# Patient Record
Sex: MALE | Race: BLACK OR AFRICAN AMERICAN | Employment: OTHER | URBAN - METROPOLITAN AREA
[De-identification: names, ages, dates, MRNs, and addresses within clinical notes are randomized per-mention and may not be internally consistent; named-entity substitution may affect disease eponyms.]

---

## 2017-01-13 ENCOUNTER — OP HISTORICAL/CONVERTED ENCOUNTER (OUTPATIENT)
Dept: OTHER | Age: 82
End: 2017-01-13

## 2017-03-03 ENCOUNTER — OP HISTORICAL/CONVERTED ENCOUNTER (OUTPATIENT)
Dept: OTHER | Age: 82
End: 2017-03-03

## 2017-03-08 ENCOUNTER — OP HISTORICAL/CONVERTED ENCOUNTER (OUTPATIENT)
Dept: OTHER | Age: 82
End: 2017-03-08

## 2017-04-05 ENCOUNTER — OP HISTORICAL/CONVERTED ENCOUNTER (OUTPATIENT)
Dept: OTHER | Age: 82
End: 2017-04-05

## 2017-04-12 ENCOUNTER — OP HISTORICAL/CONVERTED ENCOUNTER (OUTPATIENT)
Dept: OTHER | Age: 82
End: 2017-04-12

## 2017-06-07 ENCOUNTER — OP HISTORICAL/CONVERTED ENCOUNTER (OUTPATIENT)
Dept: OTHER | Age: 82
End: 2017-06-07

## 2017-08-09 ENCOUNTER — OP HISTORICAL/CONVERTED ENCOUNTER (OUTPATIENT)
Dept: OTHER | Age: 82
End: 2017-08-09

## 2017-11-08 ENCOUNTER — OP HISTORICAL/CONVERTED ENCOUNTER (OUTPATIENT)
Dept: OTHER | Age: 82
End: 2017-11-08

## 2017-12-13 ENCOUNTER — OP HISTORICAL/CONVERTED ENCOUNTER (OUTPATIENT)
Dept: OTHER | Age: 82
End: 2017-12-13

## 2018-02-19 ENCOUNTER — ED HISTORICAL/CONVERTED ENCOUNTER (OUTPATIENT)
Dept: OTHER | Age: 83
End: 2018-02-19

## 2018-07-02 ENCOUNTER — OP HISTORICAL/CONVERTED ENCOUNTER (OUTPATIENT)
Dept: OTHER | Age: 83
End: 2018-07-02

## 2018-08-01 ENCOUNTER — ED HISTORICAL/CONVERTED ENCOUNTER (OUTPATIENT)
Dept: OTHER | Age: 83
End: 2018-08-01

## 2018-10-01 ENCOUNTER — OP HISTORICAL/CONVERTED ENCOUNTER (OUTPATIENT)
Dept: OTHER | Age: 83
End: 2018-10-01

## 2018-11-22 ENCOUNTER — ED HISTORICAL/CONVERTED ENCOUNTER (OUTPATIENT)
Dept: OTHER | Age: 83
End: 2018-11-22

## 2019-01-28 ENCOUNTER — OP HISTORICAL/CONVERTED ENCOUNTER (OUTPATIENT)
Dept: OTHER | Age: 84
End: 2019-01-28

## 2019-04-26 ENCOUNTER — OP HISTORICAL/CONVERTED ENCOUNTER (OUTPATIENT)
Dept: OTHER | Age: 84
End: 2019-04-26

## 2019-05-21 ENCOUNTER — OP HISTORICAL/CONVERTED ENCOUNTER (OUTPATIENT)
Dept: OTHER | Age: 84
End: 2019-05-21

## 2019-10-01 ENCOUNTER — OP HISTORICAL/CONVERTED ENCOUNTER (OUTPATIENT)
Dept: OTHER | Age: 84
End: 2019-10-01

## 2020-02-04 LAB
CREATININE, EXTERNAL: 2.25
HBA1C MFR BLD HPLC: 7.5 %
LDL-C, EXTERNAL: 75

## 2020-08-04 VITALS
HEART RATE: 86 BPM | DIASTOLIC BLOOD PRESSURE: 51 MMHG | WEIGHT: 241 LBS | TEMPERATURE: 97.8 F | RESPIRATION RATE: 18 BRPM | SYSTOLIC BLOOD PRESSURE: 119 MMHG | HEIGHT: 69 IN | OXYGEN SATURATION: 98 % | BODY MASS INDEX: 35.7 KG/M2

## 2020-08-04 DIAGNOSIS — N40.0 PROSTATIC HYPERTROPHY: ICD-10-CM

## 2020-08-04 PROBLEM — R35.1 NOCTURIA: Status: ACTIVE | Noted: 2020-08-04

## 2020-08-04 PROBLEM — R39.198 SLOWING OF URINARY STREAM: Status: ACTIVE | Noted: 2020-08-04

## 2020-08-04 PROBLEM — R39.9 LOWER URINARY TRACT SYMPTOMS: Status: ACTIVE | Noted: 2020-08-04

## 2020-08-04 PROBLEM — R33.9 RETENTION OF URINE: Status: ACTIVE | Noted: 2020-08-04

## 2020-08-04 PROBLEM — R35.0 INCREASED FREQUENCY OF URINATION: Status: ACTIVE | Noted: 2020-08-04

## 2020-08-04 PROBLEM — N13.9 URINARY (TRACT) OBSTRUCTION: Status: ACTIVE | Noted: 2020-08-04

## 2020-08-04 RX ORDER — METFORMIN HYDROCHLORIDE 500 MG/5ML
SOLUTION ORAL
COMMUNITY
End: 2021-08-17

## 2020-08-04 RX ORDER — AMLODIPINE, VALSARTAN AND HYDROCHLOROTHIAZIDE 10; 320; 25 MG/1; MG/1; MG/1
TABLET ORAL
COMMUNITY
End: 2021-03-26 | Stop reason: SDUPTHER

## 2020-08-04 RX ORDER — INSULIN GLARGINE 100 [IU]/ML
INJECTION, SOLUTION SUBCUTANEOUS
COMMUNITY
End: 2022-01-25 | Stop reason: ALTCHOICE

## 2020-08-04 RX ORDER — SYRINGE-NEEDLE,INSULIN,0.5 ML 30 GX5/16"
SYRINGE, EMPTY DISPOSABLE MISCELLANEOUS
COMMUNITY
End: 2022-05-08

## 2020-08-04 RX ORDER — AZITHROMYCIN 250 MG/1
250 TABLET, FILM COATED ORAL DAILY
COMMUNITY
End: 2021-03-26 | Stop reason: ALTCHOICE

## 2020-08-04 RX ORDER — EZETIMIBE 10 MG/1
TABLET ORAL
COMMUNITY
End: 2020-12-23

## 2020-08-04 RX ORDER — COLCHICINE 0.6 MG/1
0.6 TABLET ORAL DAILY
COMMUNITY
End: 2021-07-27

## 2020-08-04 RX ORDER — ALBUTEROL SULFATE 90 UG/1
AEROSOL, METERED RESPIRATORY (INHALATION)
COMMUNITY
End: 2021-12-06 | Stop reason: ALTCHOICE

## 2020-08-04 RX ORDER — TIMOLOL MALEATE 5 MG/ML
1 SOLUTION/ DROPS OPHTHALMIC 2 TIMES DAILY
COMMUNITY

## 2020-08-04 RX ORDER — ASPIRIN 81 MG/1
TABLET ORAL DAILY
COMMUNITY

## 2020-08-04 RX ORDER — ACETAMINOPHEN AND CODEINE PHOSPHATE 300; 30 MG/1; MG/1
1 TABLET ORAL
COMMUNITY
End: 2021-07-27

## 2020-08-04 RX ORDER — NAPROXEN 500 MG/1
500 TABLET ORAL 2 TIMES DAILY WITH MEALS
COMMUNITY
End: 2020-10-14

## 2020-08-04 RX ORDER — FUROSEMIDE 40 MG/1
TABLET ORAL DAILY
COMMUNITY
End: 2021-08-26

## 2020-08-04 RX ORDER — TAMSULOSIN HYDROCHLORIDE 0.4 MG/1
0.4 CAPSULE ORAL DAILY
COMMUNITY
End: 2021-07-08

## 2020-08-04 RX ORDER — LANOLIN ALCOHOL/MO/W.PET/CERES
CREAM (GRAM) TOPICAL
COMMUNITY
End: 2022-05-08

## 2020-08-04 RX ORDER — DICLOFENAC SODIUM 10 MG/G
GEL TOPICAL 4 TIMES DAILY
COMMUNITY
End: 2021-07-27

## 2020-08-04 RX ORDER — PEN NEEDLE, DIABETIC 31 GX3/16"
NEEDLE, DISPOSABLE MISCELLANEOUS
COMMUNITY
End: 2022-05-08

## 2020-08-04 RX ORDER — ISOPROPYL ALCOHOL 70 ML/100ML
SWAB TOPICAL
COMMUNITY
End: 2022-05-08

## 2020-08-04 RX ORDER — PEN NEEDLE, DIABETIC 30 GX3/16"
NEEDLE, DISPOSABLE MISCELLANEOUS
COMMUNITY
End: 2021-03-26 | Stop reason: SDUPTHER

## 2020-08-04 RX ORDER — AMOXICILLIN 500 MG/1
500 CAPSULE ORAL
COMMUNITY
End: 2021-03-26 | Stop reason: ALTCHOICE

## 2020-08-04 RX ORDER — INSULIN LISPRO 100 [IU]/ML
INJECTION, SOLUTION INTRAVENOUS; SUBCUTANEOUS
COMMUNITY
End: 2022-04-07 | Stop reason: SDUPTHER

## 2020-08-04 RX ORDER — LANCETS
EACH MISCELLANEOUS
COMMUNITY
End: 2022-05-08

## 2020-08-04 RX ORDER — ALLOPURINOL 100 MG/1
TABLET ORAL DAILY
COMMUNITY
End: 2021-07-27

## 2020-08-04 RX ORDER — BLOOD SUGAR DIAGNOSTIC
STRIP MISCELLANEOUS SEE ADMIN INSTRUCTIONS
COMMUNITY
End: 2022-05-08

## 2020-08-04 RX ORDER — AZELASTINE HCL 205.5 UG/1
SPRAY NASAL 2 TIMES DAILY
COMMUNITY
End: 2021-07-27 | Stop reason: SDUPTHER

## 2020-08-04 RX ORDER — POLYETHYLENE GLYCOL 3350, SODIUM SULFATE ANHYDROUS, SODIUM BICARBONATE, SODIUM CHLORIDE, POTASSIUM CHLORIDE 236; 22.74; 6.74; 5.86; 2.97 G/4L; G/4L; G/4L; G/4L; G/4L
POWDER, FOR SOLUTION ORAL
COMMUNITY
End: 2021-08-17

## 2020-08-04 RX ORDER — BENZONATATE 200 MG/1
200 CAPSULE ORAL
COMMUNITY
End: 2021-07-27

## 2020-08-04 RX ORDER — DONEPEZIL HYDROCHLORIDE 5 MG/1
TABLET, FILM COATED ORAL
COMMUNITY
End: 2021-07-27

## 2020-08-04 RX ORDER — FINASTERIDE 5 MG/1
5 TABLET, FILM COATED ORAL DAILY
COMMUNITY
End: 2021-06-11 | Stop reason: SDUPTHER

## 2020-08-04 RX ORDER — PRAVASTATIN SODIUM 40 MG/1
40 TABLET ORAL
COMMUNITY
End: 2020-09-22

## 2020-08-04 RX ORDER — CALCIUM CARB/VITAMIN D3/VIT K1 500-100-40
TABLET,CHEWABLE ORAL
COMMUNITY
End: 2021-03-26 | Stop reason: SDUPTHER

## 2020-08-05 PROBLEM — E78.5 HYPERLIPIDEMIA: Status: ACTIVE | Noted: 2020-08-05

## 2020-08-05 PROBLEM — R41.3 MEMORY IMPAIRMENT: Status: ACTIVE | Noted: 2020-08-05

## 2020-08-05 PROBLEM — I10 ESSENTIAL HYPERTENSION: Status: ACTIVE | Noted: 2020-08-05

## 2020-08-05 PROBLEM — R60.0 EDEMA OF LOWER EXTREMITY: Status: ACTIVE | Noted: 2020-08-05

## 2020-08-05 PROBLEM — D50.9 IRON DEFICIENCY ANEMIA: Status: ACTIVE | Noted: 2020-08-05

## 2020-08-05 PROBLEM — E78.5 DYSLIPIDEMIA ASSOCIATED WITH TYPE 2 DIABETES MELLITUS (HCC): Status: ACTIVE | Noted: 2020-08-05

## 2020-08-05 PROBLEM — I25.10 CORONARY ARTERIOSCLEROSIS: Status: ACTIVE | Noted: 2020-08-05

## 2020-08-05 PROBLEM — N18.30 CHRONIC KIDNEY DISEASE, STAGE 3 (HCC): Status: ACTIVE | Noted: 2020-08-05

## 2020-08-05 PROBLEM — E87.6 HYPOKALEMIA: Status: ACTIVE | Noted: 2020-08-05

## 2020-08-05 PROBLEM — E11.69 DYSLIPIDEMIA ASSOCIATED WITH TYPE 2 DIABETES MELLITUS (HCC): Status: ACTIVE | Noted: 2020-08-05

## 2020-08-05 PROBLEM — E11.9 TYPE 2 DIABETES MELLITUS WITHOUT COMPLICATION (HCC): Status: ACTIVE | Noted: 2020-08-05

## 2020-08-05 RX ORDER — GLUCOSAMINE/CHONDR SU A SOD 167-133 MG
500 CAPSULE ORAL
COMMUNITY
End: 2021-07-27

## 2020-08-05 RX ORDER — PROMETHAZINE HYDROCHLORIDE AND CODEINE PHOSPHATE 6.25; 1 MG/5ML; MG/5ML
SOLUTION ORAL
COMMUNITY
End: 2021-03-26 | Stop reason: SDUPTHER

## 2020-08-05 RX ORDER — PERMETHRIN 50 MG/G
CREAM TOPICAL
COMMUNITY
End: 2021-07-27

## 2020-08-05 RX ORDER — LIDOCAINE 50 MG/G
OINTMENT TOPICAL AS NEEDED
COMMUNITY
End: 2021-07-27

## 2020-08-05 RX ORDER — POLYETHYLENE GLYCOL 3350 17 G/17G
17 POWDER, FOR SOLUTION ORAL DAILY
COMMUNITY
End: 2022-01-25 | Stop reason: ALTCHOICE

## 2020-08-05 RX ORDER — LIDOCAINE AND PRILOCAINE 25; 25 MG/G; MG/G
CREAM TOPICAL AS NEEDED
COMMUNITY
End: 2021-07-27

## 2020-08-05 RX ORDER — PREDNISONE 5 MG/1
TABLET ORAL SEE ADMIN INSTRUCTIONS
COMMUNITY
End: 2021-07-27

## 2020-08-05 RX ORDER — CODEINE PHOSPHATE AND GUAIFENESIN 10; 100 MG/5ML; MG/5ML
5 SOLUTION ORAL
COMMUNITY
End: 2021-07-27

## 2020-08-05 RX ORDER — PROMETHAZINE HYDROCHLORIDE AND PHENYLEPHRINE HYDROCHLORIDE 6.25; 5 MG/5ML; MG/5ML
5 SYRUP ORAL
COMMUNITY
End: 2021-07-27

## 2020-08-05 RX ORDER — SYRINGE-NEEDLE,INSULIN,0.5 ML 30 GX5/16"
SYRINGE, EMPTY DISPOSABLE MISCELLANEOUS
COMMUNITY
End: 2021-03-26 | Stop reason: SDUPTHER

## 2020-08-05 RX ORDER — PREDNISONE 20 MG/1
TABLET ORAL
COMMUNITY
End: 2021-07-27

## 2020-08-05 RX ORDER — TRAMADOL HYDROCHLORIDE 50 MG/1
50 TABLET ORAL
COMMUNITY
End: 2021-07-27

## 2020-09-22 RX ORDER — PRAVASTATIN SODIUM 40 MG/1
TABLET ORAL
Qty: 90 TAB | Refills: 0 | Status: SHIPPED | OUTPATIENT
Start: 2020-09-22 | End: 2021-12-06

## 2020-10-12 ENCOUNTER — TELEPHONE (OUTPATIENT)
Dept: FAMILY MEDICINE CLINIC | Age: 85
End: 2020-10-12

## 2020-10-12 DIAGNOSIS — E78.5 HYPERLIPIDEMIA, UNSPECIFIED HYPERLIPIDEMIA TYPE: Primary | ICD-10-CM

## 2020-10-14 RX ORDER — NAPROXEN 500 MG/1
TABLET ORAL
Qty: 60 TAB | Refills: 0 | Status: SHIPPED | OUTPATIENT
Start: 2020-10-14 | End: 2021-04-27 | Stop reason: SDUPTHER

## 2020-10-14 RX ORDER — PRAVASTATIN SODIUM 40 MG/1
40 TABLET ORAL
Qty: 90 TAB | Refills: 1 | Status: SHIPPED | OUTPATIENT
Start: 2020-10-14 | End: 2020-10-21 | Stop reason: SDUPTHER

## 2020-10-21 RX ORDER — PRAVASTATIN SODIUM 40 MG/1
40 TABLET ORAL
Qty: 90 TAB | Refills: 1 | Status: SHIPPED | OUTPATIENT
Start: 2020-10-21 | End: 2021-04-19

## 2020-12-23 RX ORDER — EZETIMIBE 10 MG/1
TABLET ORAL
Qty: 90 TAB | Refills: 0 | Status: SHIPPED | OUTPATIENT
Start: 2020-12-23 | End: 2021-03-19

## 2021-01-04 ENCOUNTER — TELEPHONE (OUTPATIENT)
Dept: FAMILY MEDICINE CLINIC | Age: 86
End: 2021-01-04

## 2021-01-04 NOTE — TELEPHONE ENCOUNTER
Left a voicemail on both home and mobile phone with the phone number for [facility]. Patient needs to contact them with more information for his medication to be sent.   [phone]

## 2021-01-19 ENCOUNTER — TELEPHONE (OUTPATIENT)
Dept: FAMILY MEDICINE CLINIC | Age: 86
End: 2021-01-19

## 2021-02-12 ENCOUNTER — TELEPHONE (OUTPATIENT)
Dept: FAMILY MEDICINE CLINIC | Age: 86
End: 2021-02-12

## 2021-02-12 RX ORDER — AMLODIPINE AND VALSARTAN 10; 320 MG/1; MG/1
TABLET ORAL
Qty: 90 TAB | Refills: 0 | Status: SHIPPED | OUTPATIENT
Start: 2021-02-12 | End: 2021-02-19

## 2021-02-16 ENCOUNTER — TELEPHONE (OUTPATIENT)
Dept: FAMILY MEDICINE CLINIC | Age: 86
End: 2021-02-16

## 2021-03-05 ENCOUNTER — IMMUNIZATION (OUTPATIENT)
Dept: FAMILY MEDICINE CLINIC | Age: 86
End: 2021-03-05
Payer: MEDICARE

## 2021-03-05 DIAGNOSIS — Z23 ENCOUNTER FOR IMMUNIZATION: Primary | ICD-10-CM

## 2021-03-05 PROCEDURE — 0011A COVID-19, MRNA, LNP-S, PF, 100MCG/0.5ML DOSE(MODERNA): CPT | Performed by: FAMILY MEDICINE

## 2021-03-05 PROCEDURE — 91301 COVID-19, MRNA, LNP-S, PF, 100MCG/0.5ML DOSE(MODERNA): CPT | Performed by: FAMILY MEDICINE

## 2021-03-19 RX ORDER — EZETIMIBE 10 MG/1
TABLET ORAL
Qty: 90 TAB | Refills: 0 | Status: SHIPPED | OUTPATIENT
Start: 2021-03-19 | End: 2021-04-23

## 2021-03-26 ENCOUNTER — OFFICE VISIT (OUTPATIENT)
Dept: FAMILY MEDICINE CLINIC | Age: 86
End: 2021-03-26
Payer: MEDICARE

## 2021-03-26 VITALS
HEART RATE: 85 BPM | WEIGHT: 240 LBS | BODY MASS INDEX: 35.55 KG/M2 | DIASTOLIC BLOOD PRESSURE: 63 MMHG | HEIGHT: 69 IN | RESPIRATION RATE: 16 BRPM | OXYGEN SATURATION: 98 % | TEMPERATURE: 97.1 F | SYSTOLIC BLOOD PRESSURE: 121 MMHG

## 2021-03-26 DIAGNOSIS — E11.9 TYPE 2 DIABETES MELLITUS WITHOUT COMPLICATION, WITH LONG-TERM CURRENT USE OF INSULIN (HCC): ICD-10-CM

## 2021-03-26 DIAGNOSIS — Z79.4 TYPE 2 DIABETES MELLITUS WITHOUT COMPLICATION, WITH LONG-TERM CURRENT USE OF INSULIN (HCC): ICD-10-CM

## 2021-03-26 DIAGNOSIS — I10 ESSENTIAL HYPERTENSION: Primary | ICD-10-CM

## 2021-03-26 DIAGNOSIS — E78.5 HYPERLIPIDEMIA, UNSPECIFIED HYPERLIPIDEMIA TYPE: ICD-10-CM

## 2021-03-26 DIAGNOSIS — E78.5 DYSLIPIDEMIA ASSOCIATED WITH TYPE 2 DIABETES MELLITUS (HCC): ICD-10-CM

## 2021-03-26 DIAGNOSIS — E11.69 DYSLIPIDEMIA ASSOCIATED WITH TYPE 2 DIABETES MELLITUS (HCC): ICD-10-CM

## 2021-03-26 PROCEDURE — G8417 CALC BMI ABV UP PARAM F/U: HCPCS | Performed by: NURSE PRACTITIONER

## 2021-03-26 PROCEDURE — G8432 DEP SCR NOT DOC, RNG: HCPCS | Performed by: NURSE PRACTITIONER

## 2021-03-26 PROCEDURE — G8536 NO DOC ELDER MAL SCRN: HCPCS | Performed by: NURSE PRACTITIONER

## 2021-03-26 PROCEDURE — G8752 SYS BP LESS 140: HCPCS | Performed by: NURSE PRACTITIONER

## 2021-03-26 PROCEDURE — G8427 DOCREV CUR MEDS BY ELIG CLIN: HCPCS | Performed by: NURSE PRACTITIONER

## 2021-03-26 PROCEDURE — G8754 DIAS BP LESS 90: HCPCS | Performed by: NURSE PRACTITIONER

## 2021-03-26 PROCEDURE — 99214 OFFICE O/P EST MOD 30 MIN: CPT | Performed by: NURSE PRACTITIONER

## 2021-03-26 PROCEDURE — 1101F PT FALLS ASSESS-DOCD LE1/YR: CPT | Performed by: NURSE PRACTITIONER

## 2021-03-26 NOTE — PROGRESS NOTES
Chief Complaint   Patient presents with    Follow-up    Diabetes    Hypertension    Chronic Kidney Disease     Goes back to the kidney doctor in June 1. Have you been to the ER, urgent care clinic since your last visit? Hospitalized since your last visit? No    2. Have you seen or consulted any other health care providers outside of the [address] [place] [address] since your last visit? Include any pap smears or colon screening. Yes Reason for visit: Kidney specialist and spoke to the cardilologist over the phone for follow up visits.       Visit Vitals  /63 (BP 1 Location: Right arm, BP Patient Position: Sitting, BP Cuff Size: Adult long)   Pulse 85   Temp 97.1 °F (36.2 °C) (Temporal)   Resp 16   Ht 5' 9\" (1.753 m)   Wt 240 lb (108.9 kg)   SpO2 98%   BMI 35.44 kg/m²

## 2021-03-29 NOTE — PROGRESS NOTES
Stephanie Melo (: 1935) is a 82 y.o. male, established patient, here for evaluation of the following chief complaint(s):  Follow-up, Diabetes, Hypertension, and Chronic Kidney Disease (Goes back to the kidney doctor in )       ASSESSMENT/PLAN:  1. Essential hypertension  -     CBC WITH AUTOMATED DIFF  -     METABOLIC PANEL, COMPREHENSIVE  -     LIPID PANEL  -     THYROID CASCADE PROFILE  -     MICROALBUMIN, UR, RAND W/ MICROALB/CREAT RATIO  2. Type 2 diabetes mellitus without complication, with long-term current use of insulin (HCC)  -     MICROALBUMIN, UR, RAND W/ MICROALB/CREAT RATIO  -     HEMOGLOBIN A1C WITH EAG  3. Hyperlipidemia, unspecified hyperlipidemia type  -     CBC WITH AUTOMATED DIFF  -     METABOLIC PANEL, COMPREHENSIVE  -     LIPID PANEL  -     THYROID CASCADE PROFILE  4. Dyslipidemia associated with type 2 diabetes mellitus ([facility] [address]      Return in about 4 weeks (around 2021) for medicare wellness, Lab review. SUBJECTIVE/OBJECTIVE:  HPI  Patient presenting for hypertension followup, diabetes follow up, medication refill, hyperlipidemia check. Patient reports blood pressures at home most frequently normal. Patient has symptoms of none of the following; no chest pain, shortness of breath, weakness, orthostatic hypotension, cough, myalgias, rash, headaches, weight gain, leg swelling, palpitations, slow heart rate, fatigue, depression. Patient reports good compliance with medications, no side effects from medications noted. Current treatments include diet modification.      Review of Systems  All other systems reviewed and are negative, vital signs reviewed  Visit Vitals  /63 (BP 1 Location: Right arm, BP Patient Position: Sitting, BP Cuff Size: Adult long)   Pulse 85   Temp 97.1 °F (36.2 °C) (Temporal)   Resp 16   Ht 5' 9\" (1.753 m)   Wt 240 lb (108.9 kg)   SpO2 98%   BMI 35.44 kg/m²       Physical Exam  Constitutional:  No acute distress  HEENT:  Head normocephalic and atraumatic. CV:  Regular rate and rhythm. No murmur. Respiratory:  Lungs clear to auscultation bilaterally  Abdomen:  Soft, non-tender. Skin:  Normal color. Warm and Dry  Extremities:  Non-tender. No pedal edema. Back:  No tenderness  Neuro:  No gross motor deficits    An electronic signature was used to authenticate this note.   -- Farzad Willson, NP

## 2021-03-31 ENCOUNTER — DOCUMENTATION ONLY (OUTPATIENT)
Dept: UROLOGY | Age: 86
End: 2021-03-31

## 2021-03-31 NOTE — PROGRESS NOTES
Patients pharmacy [facility] is requesting refills Tamsulosin HCL 0.4mg capsule. Patient has never had visit with [facility], needs to be seen.

## 2021-04-01 RX ORDER — SENNA/FENNEL
TABLET ORAL
COMMUNITY
End: 2021-07-27

## 2021-04-02 ENCOUNTER — IMMUNIZATION (OUTPATIENT)
Dept: FAMILY MEDICINE CLINIC | Age: 86
End: 2021-04-02
Payer: MEDICARE

## 2021-04-02 ENCOUNTER — TELEPHONE (OUTPATIENT)
Dept: UROLOGY | Age: 86
End: 2021-04-02

## 2021-04-02 DIAGNOSIS — Z23 ENCOUNTER FOR IMMUNIZATION: Primary | ICD-10-CM

## 2021-04-02 PROCEDURE — 91301 COVID-19, MRNA, LNP-S, PF, 100MCG/0.5ML DOSE(MODERNA): CPT | Performed by: FAMILY MEDICINE

## 2021-04-02 PROCEDURE — 0012A COVID-19, MRNA, LNP-S, PF, 100MCG/0.5ML DOSE(MODERNA): CPT | Performed by: FAMILY MEDICINE

## 2021-04-02 NOTE — TELEPHONE ENCOUNTER
LVM for pt letting pt know that he would need to make an appointment with one of the doctors for a refill on his medication  since the refill request was for Dr. Jesse Roque and he has retired. I asked him to call the office back to schedule that appointment.

## 2021-04-09 ENCOUNTER — TELEPHONE (OUTPATIENT)
Dept: FAMILY MEDICINE CLINIC | Age: 86
End: 2021-04-09

## 2021-04-09 NOTE — TELEPHONE ENCOUNTER
Daughter called and LVM stating  her father needs help with the diabetes testing / reader.  How can we help him to get his reader to work?     I returned her call.  Suggested going to the pharmacy and possible home health for diabetes management, utube videos and scheduling an appointment that she can attend.  She will call back if she needs to schedule in office.  Can we put in a [facility] referral?

## 2021-04-14 LAB
ALBUMIN SERPL-MCNC: 3.8 G/DL (ref 3.6–4.6)
ALBUMIN/CREAT UR: 4 MG/G CREAT (ref 0–29)
ALBUMIN/GLOB SERPL: 1.6 {RATIO} (ref 1.2–2.2)
ALP SERPL-CCNC: 98 IU/L (ref 39–117)
ALT SERPL-CCNC: 13 IU/L (ref 0–44)
AST SERPL-CCNC: 12 IU/L (ref 0–40)
BASOPHILS # BLD AUTO: 0 X10E3/UL (ref 0–0.2)
BASOPHILS NFR BLD AUTO: 0 %
BILIRUB SERPL-MCNC: 0.4 MG/DL (ref 0–1.2)
BUN SERPL-MCNC: 24 MG/DL (ref 8–27)
BUN/CREAT SERPL: 13 (ref 10–24)
CALCIUM SERPL-MCNC: 8.4 MG/DL (ref 8.6–10.2)
CHLORIDE SERPL-SCNC: 105 MMOL/L (ref 96–106)
CHOLEST SERPL-MCNC: 122 MG/DL (ref 100–199)
CO2 SERPL-SCNC: 25 MMOL/L (ref 20–29)
CREAT SERPL-MCNC: 1.92 MG/DL (ref 0.76–1.27)
CREAT UR-MCNC: 163.3 MG/DL
EOSINOPHIL # BLD AUTO: 0.2 X10E3/UL (ref 0–0.4)
EOSINOPHIL NFR BLD AUTO: 2 %
ERYTHROCYTE [DISTWIDTH] IN BLOOD BY AUTOMATED COUNT: 13.2 % (ref 11.6–15.4)
EST. AVERAGE GLUCOSE BLD GHB EST-MCNC: 117 MG/DL
GLOBULIN SER CALC-MCNC: 2.4 G/DL (ref 1.5–4.5)
GLUCOSE SERPL-MCNC: 123 MG/DL (ref 65–99)
HBA1C MFR BLD: 5.7 % (ref 4.8–5.6)
HCT VFR BLD AUTO: 34.1 % (ref 37.5–51)
HDLC SERPL-MCNC: 43 MG/DL
HGB BLD-MCNC: 11.3 G/DL (ref 13–17.7)
IMM GRANULOCYTES # BLD AUTO: 0 X10E3/UL (ref 0–0.1)
IMM GRANULOCYTES NFR BLD AUTO: 0 %
LDLC SERPL CALC-MCNC: 66 MG/DL (ref 0–99)
LYMPHOCYTES # BLD AUTO: 1.2 X10E3/UL (ref 0.7–3.1)
LYMPHOCYTES NFR BLD AUTO: 15 %
MCH RBC QN AUTO: 31.4 PG (ref 26.6–33)
MCHC RBC AUTO-ENTMCNC: 33.1 G/DL (ref 31.5–35.7)
MCV RBC AUTO: 95 FL (ref 79–97)
MICROALBUMIN UR-MCNC: 6 UG/ML
MONOCYTES # BLD AUTO: 0.4 X10E3/UL (ref 0.1–0.9)
MONOCYTES NFR BLD AUTO: 5 %
NEUTROPHILS # BLD AUTO: 6.5 X10E3/UL (ref 1.4–7)
NEUTROPHILS NFR BLD AUTO: 78 %
PLATELET # BLD AUTO: 140 X10E3/UL (ref 150–450)
POTASSIUM SERPL-SCNC: 4.4 MMOL/L (ref 3.5–5.2)
PROT SERPL-MCNC: 6.2 G/DL (ref 6–8.5)
RBC # BLD AUTO: 3.6 X10E6/UL (ref 4.14–5.8)
SODIUM SERPL-SCNC: 143 MMOL/L (ref 134–144)
TRIGL SERPL-MCNC: 61 MG/DL (ref 0–149)
TSH SERPL DL<=0.005 MIU/L-ACNC: 3.13 UIU/ML (ref 0.45–4.5)
VLDLC SERPL CALC-MCNC: 13 MG/DL (ref 5–40)
WBC # BLD AUTO: 8.3 X10E3/UL (ref 3.4–10.8)

## 2021-04-23 RX ORDER — EZETIMIBE 10 MG/1
TABLET ORAL
Qty: 90 TAB | Refills: 0 | Status: SHIPPED | OUTPATIENT
Start: 2021-04-23 | End: 2021-07-27 | Stop reason: SDUPTHER

## 2021-04-27 ENCOUNTER — OFFICE VISIT (OUTPATIENT)
Dept: FAMILY MEDICINE CLINIC | Age: 86
End: 2021-04-27
Payer: MEDICARE

## 2021-04-27 VITALS
RESPIRATION RATE: 18 BRPM | WEIGHT: 243 LBS | OXYGEN SATURATION: 99 % | SYSTOLIC BLOOD PRESSURE: 134 MMHG | DIASTOLIC BLOOD PRESSURE: 68 MMHG | TEMPERATURE: 97.8 F | HEART RATE: 81 BPM | HEIGHT: 69 IN | BODY MASS INDEX: 35.99 KG/M2

## 2021-04-27 DIAGNOSIS — Z79.4 CONTROLLED TYPE 2 DIABETES MELLITUS WITHOUT COMPLICATION, WITH LONG-TERM CURRENT USE OF INSULIN (HCC): Primary | ICD-10-CM

## 2021-04-27 DIAGNOSIS — M19.90 ARTHRITIS: ICD-10-CM

## 2021-04-27 DIAGNOSIS — E11.9 CONTROLLED TYPE 2 DIABETES MELLITUS WITHOUT COMPLICATION, WITH LONG-TERM CURRENT USE OF INSULIN (HCC): Primary | ICD-10-CM

## 2021-04-27 PROCEDURE — G8752 SYS BP LESS 140: HCPCS | Performed by: NURSE PRACTITIONER

## 2021-04-27 PROCEDURE — 1101F PT FALLS ASSESS-DOCD LE1/YR: CPT | Performed by: NURSE PRACTITIONER

## 2021-04-27 PROCEDURE — G0439 PPPS, SUBSEQ VISIT: HCPCS | Performed by: NURSE PRACTITIONER

## 2021-04-27 PROCEDURE — G8417 CALC BMI ABV UP PARAM F/U: HCPCS | Performed by: NURSE PRACTITIONER

## 2021-04-27 PROCEDURE — G8432 DEP SCR NOT DOC, RNG: HCPCS | Performed by: NURSE PRACTITIONER

## 2021-04-27 PROCEDURE — G8536 NO DOC ELDER MAL SCRN: HCPCS | Performed by: NURSE PRACTITIONER

## 2021-04-27 PROCEDURE — G8427 DOCREV CUR MEDS BY ELIG CLIN: HCPCS | Performed by: NURSE PRACTITIONER

## 2021-04-27 PROCEDURE — G8754 DIAS BP LESS 90: HCPCS | Performed by: NURSE PRACTITIONER

## 2021-04-27 RX ORDER — NAPROXEN 500 MG/1
TABLET ORAL
Qty: 60 TAB | Refills: 5 | Status: SHIPPED | OUTPATIENT
Start: 2021-04-27

## 2021-04-27 RX ORDER — FLASH GLUCOSE SCANNING READER
1 EACH MISCELLANEOUS
Qty: 6 EACH | Refills: 3 | Status: SHIPPED | OUTPATIENT
Start: 2021-04-27 | End: 2022-01-25 | Stop reason: ALTCHOICE

## 2021-04-27 NOTE — PROGRESS NOTES
This is the Subsequent Medicare Annual Wellness Exam, performed 12 months or more after the Initial AWV or the last Subsequent AWV    I have reviewed the patient's medical history in detail and updated the computerized patient record. Assessment/Plan   Education and counseling provided:  Are appropriate based on today's review and evaluation  Has f/u with nephro in June 2021  1. Controlled type 2 diabetes mellitus without complication, with long-term current use of insulin (HCC)  -     flash glucose scanning reader ([employer]edStyle Rachel 14 Day [place]) misc; 1 Each by Does Not Apply route every fourteen (14) days. , Normal, Disp-6 Each, R-3  2. Arthritis  -     naproxen (NAPROSYN) 500 mg tablet; Take 1 tablet by mouth twice daily as needed, Normal, Disp-60 Tab, R-5       Depression Risk Factor Screening     3 most recent PHQ Screens 4/27/2021   Little interest or pleasure in doing things Not at all   Feeling down, depressed, irritable, or hopeless Not at all   Total Score PHQ 2 0       Alcohol Risk Screen    Do you average more than 1 drink per night or more than 7 drinks a week: No    In the past three months have you have had more than 4 drinks containing alcohol on one occasion: No        Functional Ability and Level of Safety    Hearing: Hearing is good. Whisper Test done with normal results. Activities of Daily Living: The home contains: no safety equipment. Patient does total self care      Ambulation: with no difficulty     Fall Risk:  Fall Risk Assessment, last 12 mths 4/27/2021   Able to walk? Yes   Fall in past 12 months? 0   Do you feel unsteady? 0   Are you worried about falling 0      Abuse Screen:  Patient is not abused       Cognitive Screening    Has your family/caregiver stated any concerns about your memory: yes - Patient states that he knows his memory is bad. Short term memory is bad.      Cognitive Screening: Normal - MMSE (Mini Mental Status Exam), Clock Drawing Test, Verbal Fluency Test    Health Maintenance Due     Health Maintenance Due   Topic Date Due    Foot Exam Q1  Never done    Eye Exam Retinal or Dilated  Never done    Shingrix Vaccine Age 50> (1 of 2) Never done    Medicare Yearly Exam  04/22/2021       Patient Care Team   Patient Care Team:  Judyth Siemens, NP as PCP - General (Nurse Practitioner)  Judyth Siemens, NP as PCP - [facility]    History     Patient Active Problem List   Diagnosis Code    BPH (benign prostatic hyperplasia) N40.0    Prostatic hypertrophy N40.0    Increased frequency of urination R35.0    Lower urinary tract symptoms R39.9    Nocturia R35.1    Retention of urine R33.9    Slowing of urinary stream R39.198    Urinary (tract) obstruction N13.9    Chronic kidney disease, stage 3 ([address] N18.30    Coronary arteriosclerosis I25.10    Dyslipidemia associated with type 2 diabetes mellitus ([address] E11.69, E78.5    Edema of lower extremity R60.0    Essential hypertension I10    Hyperlipidemia E78.5    Hypokalemia E87.6    Iron deficiency anemia D50.9    Memory impairment R41.3    Diabetes mellitus type 2, uncontrolled ([address] E11.65    Type 2 diabetes mellitus without complication ([address] E64.5     Past Medical History:   Diagnosis Date    Chronic kidney disease     Hypertension       History reviewed. No pertinent surgical history. Current Outpatient Medications   Medication Sig Dispense Refill    flash glucose scanning reader (semanticlabsStyle Rachel 14 Day [place]) misc 1 Each by Does Not Apply route every fourteen (14) days. 6 Each 3    naproxen (NAPROSYN) 500 mg tablet Take 1 tablet by mouth twice daily as needed 60 Tab 5    ezetimibe (ZETIA) 10 mg tablet TAKE 1 TABLET BY MOUTH ONCE DAILY AT BEDTIME 90 Tab 0    senna-fennel (Natural Vegetable Laxative) tab Natural Vegetable Laxative tablet   Take by oral route.       amLODIPine-valsartan (EXFORGE)  mg per tablet Take 1 tablet by mouth once daily 90 Tab 0    pravastatin (PRAVACHOL) 40 mg tablet TAKE 1 TABLET BY MOUTH ONCE DAILY IN THE EVENING 90 Tab 0    insulin detemir U-100 (Levemir U-100 Insulin) 100 unit/mL injection by SubCUTAneous route nightly.  nicotinic acid (NIACIN) 500 mg tablet Take 500 mg by mouth Daily (before breakfast).  polyethylene glycol (MIRALAX) 17 gram packet Take 17 g by mouth daily.  albuterol (PROVENTIL HFA, VENTOLIN HFA, PROAIR HFA) 90 mcg/actuation inhaler Take  by inhalation.  allopurinoL (ZYLOPRIM) 100 mg tablet Take  by mouth daily.  benzonatate (TESSALON) 200 mg capsule Take 200 mg by mouth three (3) times daily as needed for Cough.  aspirin delayed-release (Ecotrin Low Strength) 81 mg tablet Take  by mouth daily.  ferrous sulfate 325 mg (65 mg iron) tablet Take  by mouth Daily (before breakfast).  finasteride (PROSCAR) 5 mg tablet Take 5 mg by mouth daily.  furosemide (LASIX) 40 mg tablet Take  by mouth daily.  insulin lispro (HumaLOG KwikPen Insulin) 100 unit/mL kwikpen by SubCUTAneous route.  SITagliptin (Januvia) 50 mg tablet Take 50 mg by mouth daily.  insulin glargine (Lantus U-100 Insulin) 100 unit/mL injection by SubCUTAneous route nightly.  POTASSIUM CHLORIDE by Does Not Apply route.  tamsulosin (FLOMAX) 0.4 mg capsule Take 0.4 mg by mouth daily.  timolol (TIMOPTIC) 0.5 % ophthalmic solution 1 Drop two (2) times a day.  cholecalciferol, vitamin D3, (VITAMIN D3 PO) Take  by mouth.  glucose blood VI test strips (Accu-Chek Ghazal Plus test strp) strip by Does Not Apply route See Admin Instructions.  lancets (Accu-Chek Softclix Lancets) misc by Does Not Apply route.  alcohol swabs (Alcohol Prep Pads) padm by Apply Externally route.  azelastine (ASTEPRO) 0.15 % (205.5 mcg) two (2) times a day.  Insulin Needles, Disposable, (BD Ultra-Fine Mini Pen Needle) 31 gauge x 3/16\" ndle by Does Not Apply route.       colchicine 0.6 mg tablet Take 0.6 mg by mouth daily.  Insulin Syringe-Needle U-100 (Comfort EZ Insulin Syringe) 0.5 mL 30 gauge x 5/16\" syrg by Does Not Apply route.  diclofenac (VOLTAREN) 1 % gel Apply  to affected area four (4) times daily.  donepeziL (ARICEPT) 5 mg tablet Take  by mouth nightly.  lidocaine (XYLOCAINE) 5 % ointment Apply  to affected area as needed for Pain.  lidocaine-prilocaine (EMLA) topical cream Apply  to affected area as needed for Pain.  permethrin (ACTICIN) 5 % topical cream Apply  to affected area now. apply sparingly as directed      predniSONE (DELTASONE) 20 mg tablet Take  by mouth daily (with breakfast).  predniSONE (STERAPRED) 5 mg dose pack Take  by mouth See Admin Instructions. See administration instruction per 5mg dose pack      promethazine-phenylephrine (PROMETHAZINE VC) 6.25-5 mg/5 mL syrup Take 5 mL by mouth four (4) times daily as needed for Nausea.  traMADoL (ULTRAM) 50 mg tablet Take 50 mg by mouth every six (6) hours as needed for Pain.  guaiFENesin-codeine (Virtussin AC) 100-10 mg/5 mL solution Take 5 mL by mouth three (3) times daily as needed for Cough.  acetaminophen-codeine (TYLENOL #3) 300-30 mg per tablet Take 1 Tab by mouth every four (4) hours as needed for Pain.  metFORMIN 500 mg/5 mL soln Take  by mouth.  PEG 3350-Electrolytes (GaviLyte-G) 236-22.74-6.74 -5.86 gram suspension Take  by mouth now.  insulin regular (HumuLIN R Regular U-100 Insuln) 100 unit/mL injection by SubCUTAneous route.        No Known Allergies    Family History   Problem Relation Age of Onset    Heart Disease Mother     Diabetes Mother     Other Father         anerysm     Diabetes Father      Social History     Tobacco Use    Smoking status: Smoker, Current Status Unknown     Packs/day: 0.50     Years: 22.00     Pack years: 11.00     Types: Cigarettes    Smokeless tobacco: Never Used   Substance Use Topics    Alcohol use: Not Currently         Falguni Queen Nicely, ERICN

## 2021-04-27 NOTE — PATIENT INSTRUCTIONS
Medicare Wellness Visit, Male    The best way to live healthy is to have a lifestyle where you eat a well-balanced diet, exercise regularly, limit alcohol use, and quit all forms of tobacco/nicotine, if applicable. Regular preventive services are another way to keep healthy. Preventive services (vaccines, screening tests, monitoring & exams) can help personalize your care plan, which helps you manage your own care. Screening tests can find health problems at the earliest stages, when they are easiest to treat. [facility] follows the current, evidence-based guidelines published by the [facility] Darion ([facility]) when recommending preventive services for our patients. Because we follow these guidelines, sometimes recommendations change over time as research supports it. (For example, a prostate screening blood test is no longer routinely recommended for men with no symptoms). Of course, you and your doctor may decide to screen more often for some diseases, based on your risk and co-morbidities (chronic disease you are already diagnosed with). Preventive services for you include:  - Medicare offers their members a free annual wellness visit, which is time for you and your primary care provider to discuss and plan for your preventive service needs. Take advantage of this benefit every year!  -All adults over age 72 should receive the recommended pneumonia vaccines. Current USPSTF guidelines recommend a series of two vaccines for the best pneumonia protection.   -All adults should have a flu vaccine yearly and tetanus vaccine every 10 years.  -All adults age 48 and older should receive the shingles vaccines (series of two vaccines).        -All adults age 38-68 who are overweight should have a diabetes screening test once every three years.   -Other screening tests & preventive services for persons with diabetes include: an eye exam to screen for diabetic retinopathy, a kidney function test, a foot exam, and stricter control over your cholesterol.   -Cardiovascular screening for adults with routine risk involves an electrocardiogram (ECG) at intervals determined by the provider.   -Colorectal cancer screening should be done for adults age 54-65 with no increased risk factors for colorectal cancer. There are a number of acceptable methods of screening for this type of cancer. Each test has its own benefits and drawbacks. Discuss with your provider what is most appropriate for you during your annual wellness visit. The different tests include: colonoscopy (considered the best screening method), a fecal occult blood test, a fecal DNA test, and sigmoidoscopy.  -All adults born between [facility] should be screened once for Hepatitis C.  -An Abdominal Aortic Aneurysm (AAA) Screening is recommended for men age 73-68 who has ever smoked in their lifetime.      Here is a list of your current Health Maintenance items (your personalized list of preventive services) with a due date:  Health Maintenance Due   Topic Date Due    Diabetic Foot Care  Never done    Eye Exam  Never done    Shingles Vaccine (1 of 2) Never done    Annual Well Visit  04/22/2021

## 2021-05-08 DIAGNOSIS — I10 ESSENTIAL HYPERTENSION: ICD-10-CM

## 2021-05-11 RX ORDER — AMLODIPINE AND VALSARTAN 10; 320 MG/1; MG/1
TABLET ORAL
Qty: 90 TAB | Refills: 0 | Status: SHIPPED | OUTPATIENT
Start: 2021-05-11 | End: 2021-08-11

## 2021-05-28 RX ORDER — POTASSIUM CHLORIDE 20 MEQ/1
TABLET, EXTENDED RELEASE ORAL
Qty: 90 TABLET | Refills: 0 | Status: SHIPPED | OUTPATIENT
Start: 2021-05-28 | End: 2021-07-27 | Stop reason: SDUPTHER

## 2021-06-02 DIAGNOSIS — E78.5 HYPERLIPIDEMIA, UNSPECIFIED HYPERLIPIDEMIA TYPE: Primary | ICD-10-CM

## 2021-06-03 RX ORDER — PRAVASTATIN SODIUM 40 MG/1
40 TABLET ORAL
Qty: 90 TABLET | Refills: 3 | Status: SHIPPED | OUTPATIENT
Start: 2021-06-03 | End: 2021-10-26 | Stop reason: SDUPTHER

## 2021-06-10 ENCOUNTER — TELEPHONE (OUTPATIENT)
Dept: UROLOGY | Age: 86
End: 2021-06-10

## 2021-06-10 NOTE — TELEPHONE ENCOUNTER
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 Darrold Romberg on 7/8 and needs a refill on Finasteride 5mg and sent to  [address] [facility] in  [address] [facility]

## 2021-06-11 DIAGNOSIS — N40.1 BENIGN PROSTATIC HYPERPLASIA WITH LOWER URINARY TRACT SYMPTOMS, SYMPTOM DETAILS UNSPECIFIED: Primary | ICD-10-CM

## 2021-06-11 RX ORDER — FINASTERIDE 5 MG/1
5 TABLET, FILM COATED ORAL DAILY
Qty: 30 TABLET | Refills: 2 | Status: SHIPPED | OUTPATIENT
Start: 2021-06-11 | End: 2021-07-08 | Stop reason: SDUPTHER

## 2021-06-21 DIAGNOSIS — N40.0 BENIGN PROSTATIC HYPERPLASIA, UNSPECIFIED WHETHER LOWER URINARY TRACT SYMPTOMS PRESENT: Primary | ICD-10-CM

## 2021-06-23 RX ORDER — TAMSULOSIN HYDROCHLORIDE 0.4 MG/1
0.4 CAPSULE ORAL DAILY
Qty: 90 CAPSULE | Refills: 1 | Status: SHIPPED | OUTPATIENT
Start: 2021-06-23 | End: 2021-07-08

## 2021-07-07 PROBLEM — R39.198 SLOWING OF URINARY STREAM: Status: RESOLVED | Noted: 2020-08-04 | Resolved: 2021-07-07

## 2021-07-07 PROBLEM — R33.9 RETENTION OF URINE: Status: RESOLVED | Noted: 2020-08-04 | Resolved: 2021-07-07

## 2021-07-07 PROBLEM — N40.0 PROSTATIC HYPERTROPHY: Status: RESOLVED | Noted: 2020-08-04 | Resolved: 2021-07-07

## 2021-07-07 PROBLEM — N13.9 URINARY (TRACT) OBSTRUCTION: Status: RESOLVED | Noted: 2020-08-04 | Resolved: 2021-07-07

## 2021-07-07 PROBLEM — R35.1 NOCTURIA: Status: RESOLVED | Noted: 2020-08-04 | Resolved: 2021-07-07

## 2021-07-07 PROBLEM — R39.9 LOWER URINARY TRACT SYMPTOMS: Status: RESOLVED | Noted: 2020-08-04 | Resolved: 2021-07-07

## 2021-07-08 ENCOUNTER — OFFICE VISIT (OUTPATIENT)
Dept: UROLOGY | Age: 86
End: 2021-07-08
Payer: MEDICARE

## 2021-07-08 VITALS
RESPIRATION RATE: 24 BRPM | TEMPERATURE: 97.5 F | HEIGHT: 69 IN | BODY MASS INDEX: 35.55 KG/M2 | SYSTOLIC BLOOD PRESSURE: 166 MMHG | DIASTOLIC BLOOD PRESSURE: 70 MMHG | OXYGEN SATURATION: 99 % | HEART RATE: 80 BPM | WEIGHT: 240 LBS

## 2021-07-08 DIAGNOSIS — N40.1 BENIGN PROSTATIC HYPERPLASIA WITH LOWER URINARY TRACT SYMPTOMS, SYMPTOM DETAILS UNSPECIFIED: ICD-10-CM

## 2021-07-08 DIAGNOSIS — N40.0 BENIGN PROSTATIC HYPERPLASIA, UNSPECIFIED WHETHER LOWER URINARY TRACT SYMPTOMS PRESENT: Primary | ICD-10-CM

## 2021-07-08 DIAGNOSIS — N18.30 STAGE 3 CHRONIC KIDNEY DISEASE, UNSPECIFIED WHETHER STAGE 3A OR 3B CKD (HCC): ICD-10-CM

## 2021-07-08 LAB
BILIRUB UR QL STRIP: NEGATIVE
GLUCOSE UR-MCNC: NEGATIVE MG/DL
KETONES P FAST UR STRIP-MCNC: NEGATIVE MG/DL
PH UR STRIP: 5 [PH] (ref 4.6–8)
PROT UR QL STRIP: NEGATIVE
SP GR UR STRIP: 1.01 (ref 1–1.03)
UA UROBILINOGEN AMB POC: NORMAL (ref 0.2–1)
URINALYSIS CLARITY POC: CLEAR
URINALYSIS COLOR POC: YELLOW
URINE BLOOD POC: NORMAL
URINE LEUKOCYTES POC: NEGATIVE
URINE NITRITES POC: NEGATIVE

## 2021-07-08 PROCEDURE — G8536 NO DOC ELDER MAL SCRN: HCPCS | Performed by: UROLOGY

## 2021-07-08 PROCEDURE — 99214 OFFICE O/P EST MOD 30 MIN: CPT | Performed by: UROLOGY

## 2021-07-08 PROCEDURE — G8754 DIAS BP LESS 90: HCPCS | Performed by: UROLOGY

## 2021-07-08 PROCEDURE — G8417 CALC BMI ABV UP PARAM F/U: HCPCS | Performed by: UROLOGY

## 2021-07-08 PROCEDURE — G8427 DOCREV CUR MEDS BY ELIG CLIN: HCPCS | Performed by: UROLOGY

## 2021-07-08 PROCEDURE — G8432 DEP SCR NOT DOC, RNG: HCPCS | Performed by: UROLOGY

## 2021-07-08 PROCEDURE — G8753 SYS BP > OR = 140: HCPCS | Performed by: UROLOGY

## 2021-07-08 PROCEDURE — 81003 URINALYSIS AUTO W/O SCOPE: CPT | Performed by: UROLOGY

## 2021-07-08 RX ORDER — FINASTERIDE 5 MG/1
5 TABLET, FILM COATED ORAL DAILY
Qty: 90 TABLET | Refills: 3 | Status: SHIPPED | OUTPATIENT
Start: 2021-07-08 | End: 2021-07-27 | Stop reason: SDUPTHER

## 2021-07-08 RX ORDER — TAMSULOSIN HYDROCHLORIDE 0.4 MG/1
0.4 CAPSULE ORAL DAILY
Qty: 90 CAPSULE | Refills: 3 | Status: SHIPPED | OUTPATIENT
Start: 2021-07-08 | End: 2021-08-25 | Stop reason: SDUPTHER

## 2021-07-08 RX ORDER — FINASTERIDE 5 MG/1
5 TABLET, FILM COATED ORAL DAILY
Qty: 30 TABLET | Refills: 2 | Status: SHIPPED | OUTPATIENT
Start: 2021-07-08 | End: 2021-07-08

## 2021-07-08 RX ORDER — TAMSULOSIN HYDROCHLORIDE 0.4 MG/1
0.4 CAPSULE ORAL DAILY
Qty: 90 CAPSULE | Refills: 1 | Status: SHIPPED | OUTPATIENT
Start: 2021-07-08 | End: 2021-07-08

## 2021-07-08 NOTE — PROGRESS NOTES
1. Have you been to the ER, urgent care clinic since your last visit? Hospitalized since your last visit? No    2. Have you seen or consulted any other health care providers outside of the [address] [place] [address] since your last visit? Include any pap smears or colon screening.  PCP   Chief Complaint   Patient presents with    New Patient    Benign Prostatic Hypertrophy    Urinary Frequency    Chronic Kidney Disease     Visit Vitals  BP (!) 166/70 (BP 1 Location: Right arm, BP Patient Position: Sitting, BP Cuff Size: Adult)   Pulse 80   Temp 97.5 °F (36.4 °C) (Temporal)   Resp 24   Ht 5' 9\" (1.753 m)   Wt 240 lb (108.9 kg)   SpO2 99%   BMI 35.44 kg/m²

## 2021-07-08 NOTE — PROGRESS NOTES
HISTORY OF PRESENT ILLNESS  Scar Hawkins is a 82 y.o. male. Chief Complaint   Patient presents with    New Patient    Benign Prostatic Hypertrophy    Urinary Frequency    Chronic Kidney Disease     He is an 82-year-old patient who is a former patient of Dr. Ambrose Womack in [facility]. His past medical history is significant for diabetes, insulin-dependent, dyslipidemia, hypertension, chronic kidney disease, coronary artery disease, iron deficient anemia, lower extremity edema and memory impairment. Urinary issues include BPH, and increased urinary frequency. He is managed with tamsulosin and finasteride. He is on many medications including Lasix. He is up 3-4x at night. He has a full amount but is not sure he empties. He is not bothered. Chronic Conditions Addressed Today     1. BPH (benign prostatic hyperplasia) - Primary     Overview      Previously seen/managed by Dr. Beckie Jarvis for BPH on tamsulsoin and finasteride. He was last seen on 7/9/2019 by Dr. Ambrose Womack. PSA ranged from 0.51-2.0 ([id number]). Relevant Medications     tamsulosin (Flomax) 0.4 mg capsule     finasteride (PROSCAR) 5 mg tablet     Other Relevant Orders     AMB POC URINALYSIS DIP STICK AUTO W/O MICRO     URINALYSIS W/MICROSCOPIC    2. Chronic kidney disease, stage 3 (HCC)     Overview      4/13/21 creatinine was 1.92          Relevant Medications     tamsulosin (Flomax) 0.4 mg capsule     finasteride (PROSCAR) 5 mg tablet          Past Medical History:    PMHx (including negatives):  has a past medical history of Chronic kidney disease, Diabetes ([address] and Hypertension. PSurgHx:  has a past surgical history that includes hx urological (09/24/2010); hx cholecystectomy (2010); and hx colonoscopy (2013). PSocHx:  reports that he has been smoking cigarettes. He has a 11.00 pack-year smoking history. He has never used smokeless tobacco. He reports previous alcohol use. He reports previous drug use.      Review of Systems   All other systems reviewed and are negative. Physical Exam  Constitutional:       General: He is not in acute distress. Appearance: Normal appearance. HENT:      Head: Normocephalic and atraumatic. Eyes:      Extraocular Movements: Extraocular movements intact. Pupils: Pupils are equal, round, and reactive to light. Cardiovascular:      Rate and Rhythm: Normal rate and regular rhythm. Pulmonary:      Effort: Pulmonary effort is normal. No respiratory distress. Breath sounds: Normal breath sounds. No wheezing or rhonchi. Genitourinary:     Penis: Normal. No phimosis, hypospadias or tenderness. Testes: Normal.         Right: Mass, tenderness or swelling not present. Left: Mass, tenderness or swelling not present. Epididymis:      Right: Normal. No mass or tenderness. Left: Normal. No mass or tenderness. Prostate: Enlarged (2+, benign irregular surface). Not tender and no nodules present. Rectum: Normal.   Musculoskeletal:         General: Normal range of motion. Lymphadenopathy:      Cervical: No cervical adenopathy. Upper Body:      Right upper body: No supraclavicular adenopathy. Left upper body: No supraclavicular adenopathy. Skin:     General: Skin is warm and dry. Neurological:      General: No focal deficit present. Mental Status: He is alert and oriented to person, place, and time. Psychiatric:         Mood and Affect: Mood normal.         Behavior: Behavior normal.       No Known Allergies   Prior to Admission medications    Medication Sig Start Date End Date Taking? Authorizing Provider   tamsulosin (Flomax) 0.4 mg capsule Take 1 Capsule by mouth daily. 7/8/21  Yes Travis Floers MD   finasteride (PROSCAR) 5 mg tablet Take 1 Tablet by mouth daily for 30 days. 7/8/21 8/7/21 Yes Travis Flores MD   pravastatin (PRAVACHOL) 40 mg tablet Take 1 Tablet by mouth nightly.  6/3/21  Yes Ev Black NP   amLODIPine-valsartan (Dilcia [employer])  mg per tablet TAKE 1 TABLET BY MOUTH EVERY DAY 5/11/21  Yes Mariposa Cadena NP   flash glucose scanning reader (FreeStyle Rachel 14 Day [place]) misc 1 Each by Does Not Apply route every fourteen (14) days. 4/27/21  Yes Mariposa Barney NP   naproxen (NAPROSYN) 500 mg tablet Take 1 tablet by mouth twice daily as needed 4/27/21  Yes Mariposa Barney NP   ezetimibe (ZETIA) 10 mg tablet TAKE 1 TABLET BY MOUTH ONCE DAILY AT BEDTIME 4/23/21  Yes Mariposa Barney NP   polyethylene glycol (MIRALAX) 17 gram packet Take 17 g by mouth daily. Yes Provider, Historical   albuterol (PROVENTIL HFA, VENTOLIN HFA, PROAIR HFA) 90 mcg/actuation inhaler Take  by inhalation. Yes Provider, Historical   aspirin delayed-release (Ecotrin Low Strength) 81 mg tablet Take  by mouth daily. Yes Provider, Historical   ferrous sulfate 325 mg (65 mg iron) tablet Take  by mouth Daily (before breakfast). Yes Provider, Historical   furosemide (LASIX) 40 mg tablet Take  by mouth daily. Yes Provider, Historical   insulin lispro (HumaLOG KwikPen Insulin) 100 unit/mL kwikpen by SubCUTAneous route. Yes Provider, Historical   SITagliptin (Januvia) 50 mg tablet Take 50 mg by mouth daily. Yes Provider, Historical   insulin glargine (Lantus U-100 Insulin) 100 unit/mL injection by SubCUTAneous route nightly. Yes Provider, Historical   timolol (TIMOPTIC) 0.5 % ophthalmic solution 1 Drop two (2) times a day. Yes Provider, Historical   cholecalciferol, vitamin D3, (VITAMIN D3 PO) Take  by mouth. Yes Provider, Historical   glucose blood VI test strips (Accu-Chek Ghazal Plus test strp) strip by Does Not Apply route See Admin Instructions. Yes Provider, Historical   lancets (Accu-Chek Softclix Lancets) misc by Does Not Apply route. Yes Provider, Historical   alcohol swabs (Alcohol Prep Pads) padm by Apply Externally route. Yes Provider, Historical   azelastine (ASTEPRO) 0.15 % (205.5 mcg) two (2) times a day.    Yes Provider, Historical Insulin Needles, Disposable, (BD Ultra-Fine Mini Pen Needle) 31 gauge x 3/16\" ndle by Does Not Apply route. Yes Provider, Historical   Insulin Syringe-Needle U-100 (Comfort EZ Insulin Syringe) 0.5 mL 30 gauge x 5/16\" syrg by Does Not Apply route. Yes Provider, Historical   PEG 3350-Electrolytes (GaviLyte-G) 236-22.74-6.74 -5.86 gram suspension Take  by mouth now. Yes Provider, Historical   potassium chloride (K-DUR, KLOR-CON) 20 mEq tablet TAKE 1 TABLET BY MOUTH ONCE DAILY WITH  FUROSEMIDE 3/99/23   Chandler Grider NP   senna-fennel (Natural Vegetable Laxative) tab Natural Vegetable Laxative tablet   Take by oral route. Patient not taking: Reported on 7/8/2021    Provider, Historical   pravastatin (PRAVACHOL) 40 mg tablet TAKE 1 TABLET BY MOUTH ONCE DAILY IN THE EVENING 5/47/96   Mariposa Barney NP   insulin detemir U-100 (Levemir U-100 Insulin) 100 unit/mL injection by SubCUTAneous route nightly. Patient not taking: Reported on 7/8/2021    Provider, Historical   lidocaine (XYLOCAINE) 5 % ointment Apply  to affected area as needed for Pain. Patient not taking: Reported on 7/8/2021    Provider, Historical   lidocaine-prilocaine (EMLA) topical cream Apply  to affected area as needed for Pain. Patient not taking: Reported on 7/8/2021    Provider, Historical   nicotinic acid (NIACIN) 500 mg tablet Take 500 mg by mouth Daily (before breakfast). Patient not taking: Reported on 7/8/2021    Provider, Historical   permethrin (ACTICIN) 5 % topical cream Apply  to affected area now. apply sparingly as directed  Patient not taking: Reported on 7/8/2021    Provider, Historical   predniSONE (DELTASONE) 20 mg tablet Take  by mouth daily (with breakfast). Patient not taking: Reported on 7/8/2021    Provider, Historical   predniSONE (STERAPRED) 5 mg dose pack Take  by mouth See Admin Instructions.  See administration instruction per 5mg dose pack  Patient not taking: Reported on 7/8/2021    Provider, Historical promethazine-phenylephrine (PROMETHAZINE VC) 6.25-5 mg/5 mL syrup Take 5 mL by mouth four (4) times daily as needed for Nausea. Patient not taking: Reported on 7/8/2021    Provider, Historical   traMADoL (ULTRAM) 50 mg tablet Take 50 mg by mouth every six (6) hours as needed for Pain. Patient not taking: Reported on 7/8/2021    Provider, Historical   guaiFENesin-codeine (Virtussin AC) 100-10 mg/5 mL solution Take 5 mL by mouth three (3) times daily as needed for Cough. Patient not taking: Reported on 7/8/2021    Provider, Historical   acetaminophen-codeine (TYLENOL #3) 300-30 mg per tablet Take 1 Tab by mouth every four (4) hours as needed for Pain. Patient not taking: Reported on 7/8/2021    Provider, Historical   allopurinoL (ZYLOPRIM) 100 mg tablet Take  by mouth daily. Patient not taking: Reported on 7/8/2021    Provider, Historical   benzonatate (TESSALON) 200 mg capsule Take 200 mg by mouth three (3) times daily as needed for Cough. Patient not taking: Reported on 7/8/2021    Provider, Historical   metFORMIN 500 mg/5 mL soln Take  by mouth. Patient not taking: Reported on 7/8/2021    Provider, Historical   POTASSIUM CHLORIDE by Does Not Apply route. Patient not taking: Reported on 7/8/2021    Provider, Historical   colchicine 0.6 mg tablet Take 0.6 mg by mouth daily. Patient not taking: Reported on 7/8/2021    Provider, Historical   diclofenac (VOLTAREN) 1 % gel Apply  to affected area four (4) times daily. Patient not taking: Reported on 7/8/2021    Provider, Historical   donepeziL (ARICEPT) 5 mg tablet Take  by mouth nightly. Patient not taking: Reported on 7/8/2021    Provider, Historical   insulin regular (HumuLIN R Regular U-100 Insuln) 100 unit/mL injection by SubCUTAneous route. Patient not taking: Reported on 7/8/2021    Provider, Historical        ASSESSMENT and PLAN  Diagnoses and all orders for this visit:    1.  Benign prostatic hyperplasia, unspecified whether lower urinary tract symptoms present  -     tamsulosin (Flomax) 0.4 mg capsule; Take 1 Capsule by mouth daily. 2. Stage 3 chronic kidney disease, unspecified whether stage 3a or 3b CKD ([facility]    3. Benign prostatic hyperplasia with lower urinary tract symptoms, symptom details unspecified  -     AMB POC URINALYSIS DIP STICK AUTO W/O MICRO  -     URINALYSIS W/MICROSCOPIC  -     finasteride (PROSCAR) 5 mg tablet; Take 1 Tablet by mouth daily for 30 days.            Berry Engel MD

## 2021-07-08 NOTE — LETTER
7/8/2021    Patient: Blessing Aguilar   YOB: 1935   Date of Visit: 4/2/1459     Liu Cazares NP  [address]  [place]    Dear Liu Cazares NP,      [facility] [address] [facility]. My notes for this consultation are attached. If you have questions, please do not hesitate to call me. I look forward to following your patient along with you.       Sincerely,    Kunal Celestin MD

## 2021-07-09 LAB
APPEARANCE UR: CLEAR
BACTERIA #/AREA URNS HPF: NORMAL /[HPF]
BILIRUB UR QL STRIP: NEGATIVE
CASTS URNS QL MICRO: NORMAL /LPF
COLOR UR: YELLOW
EPI CELLS #/AREA URNS HPF: NORMAL /HPF (ref 0–10)
GLUCOSE UR QL: NEGATIVE
HGB UR QL STRIP: NEGATIVE
KETONES UR QL STRIP: NEGATIVE
LEUKOCYTE ESTERASE UR QL STRIP: NEGATIVE
MICRO URNS: NORMAL
MICRO URNS: NORMAL
NITRITE UR QL STRIP: NEGATIVE
PH UR STRIP: 5.5 [PH] (ref 5–7.5)
PROT UR QL STRIP: NEGATIVE
RBC #/AREA URNS HPF: NORMAL /HPF (ref 0–2)
SP GR UR: 1.02 (ref 1–1.03)
UROBILINOGEN UR STRIP-MCNC: 0.2 MG/DL (ref 0.2–1)
WBC #/AREA URNS HPF: NORMAL /HPF (ref 0–5)

## 2021-07-27 ENCOUNTER — OFFICE VISIT (OUTPATIENT)
Dept: FAMILY MEDICINE CLINIC | Age: 86
End: 2021-07-27
Payer: MEDICARE

## 2021-07-27 VITALS
WEIGHT: 237 LBS | OXYGEN SATURATION: 98 % | DIASTOLIC BLOOD PRESSURE: 61 MMHG | TEMPERATURE: 98.9 F | RESPIRATION RATE: 18 BRPM | SYSTOLIC BLOOD PRESSURE: 126 MMHG | HEIGHT: 69 IN | BODY MASS INDEX: 35.1 KG/M2 | HEART RATE: 77 BPM

## 2021-07-27 DIAGNOSIS — I10 ESSENTIAL HYPERTENSION: ICD-10-CM

## 2021-07-27 DIAGNOSIS — Z79.4 TYPE 2 DIABETES MELLITUS WITHOUT COMPLICATION, WITH LONG-TERM CURRENT USE OF INSULIN (HCC): Primary | ICD-10-CM

## 2021-07-27 DIAGNOSIS — E11.22 TYPE 2 DIABETES MELLITUS WITH CHRONIC KIDNEY DISEASE, WITH LONG-TERM CURRENT USE OF INSULIN, UNSPECIFIED CKD STAGE (HCC): ICD-10-CM

## 2021-07-27 DIAGNOSIS — E11.9 CONTROLLED TYPE 2 DIABETES MELLITUS WITHOUT COMPLICATION, WITH LONG-TERM CURRENT USE OF INSULIN (HCC): ICD-10-CM

## 2021-07-27 DIAGNOSIS — R06.83 SNORING: ICD-10-CM

## 2021-07-27 DIAGNOSIS — N40.1 BENIGN PROSTATIC HYPERPLASIA WITH LOWER URINARY TRACT SYMPTOMS, SYMPTOM DETAILS UNSPECIFIED: ICD-10-CM

## 2021-07-27 DIAGNOSIS — J45.909 ASTHMA DUE TO SEASONAL ALLERGIES: ICD-10-CM

## 2021-07-27 DIAGNOSIS — Z79.4 TYPE 2 DIABETES MELLITUS WITH CHRONIC KIDNEY DISEASE, WITH LONG-TERM CURRENT USE OF INSULIN, UNSPECIFIED CKD STAGE (HCC): ICD-10-CM

## 2021-07-27 DIAGNOSIS — E66.01 SEVERE OBESITY (BMI 35.0-35.9 WITH COMORBIDITY) (HCC): ICD-10-CM

## 2021-07-27 DIAGNOSIS — E78.5 HYPERLIPIDEMIA, UNSPECIFIED HYPERLIPIDEMIA TYPE: ICD-10-CM

## 2021-07-27 DIAGNOSIS — E11.9 TYPE 2 DIABETES MELLITUS WITHOUT COMPLICATION, WITH LONG-TERM CURRENT USE OF INSULIN (HCC): Primary | ICD-10-CM

## 2021-07-27 DIAGNOSIS — Z79.4 CONTROLLED TYPE 2 DIABETES MELLITUS WITHOUT COMPLICATION, WITH LONG-TERM CURRENT USE OF INSULIN (HCC): ICD-10-CM

## 2021-07-27 DIAGNOSIS — Z23 ENCOUNTER FOR IMMUNIZATION: ICD-10-CM

## 2021-07-27 PROCEDURE — G8417 CALC BMI ABV UP PARAM F/U: HCPCS | Performed by: NURSE PRACTITIONER

## 2021-07-27 PROCEDURE — 99214 OFFICE O/P EST MOD 30 MIN: CPT | Performed by: NURSE PRACTITIONER

## 2021-07-27 PROCEDURE — G8432 DEP SCR NOT DOC, RNG: HCPCS | Performed by: NURSE PRACTITIONER

## 2021-07-27 PROCEDURE — G8536 NO DOC ELDER MAL SCRN: HCPCS | Performed by: NURSE PRACTITIONER

## 2021-07-27 PROCEDURE — 1101F PT FALLS ASSESS-DOCD LE1/YR: CPT | Performed by: NURSE PRACTITIONER

## 2021-07-27 PROCEDURE — G8752 SYS BP LESS 140: HCPCS | Performed by: NURSE PRACTITIONER

## 2021-07-27 PROCEDURE — G8754 DIAS BP LESS 90: HCPCS | Performed by: NURSE PRACTITIONER

## 2021-07-27 PROCEDURE — G8427 DOCREV CUR MEDS BY ELIG CLIN: HCPCS | Performed by: NURSE PRACTITIONER

## 2021-07-27 RX ORDER — EZETIMIBE 10 MG/1
TABLET ORAL
Qty: 90 TABLET | Refills: 3 | Status: SHIPPED | OUTPATIENT
Start: 2021-07-27 | End: 2022-05-08

## 2021-07-27 RX ORDER — FINASTERIDE 5 MG/1
5 TABLET, FILM COATED ORAL DAILY
Qty: 90 TABLET | Refills: 3 | Status: SHIPPED | OUTPATIENT
Start: 2021-07-27 | End: 2021-07-29 | Stop reason: SDUPTHER

## 2021-07-27 RX ORDER — FLASH GLUCOSE SENSOR
1 KIT MISCELLANEOUS
Qty: 6 KIT | Refills: 3 | Status: SHIPPED | OUTPATIENT
Start: 2021-07-27 | End: 2022-01-25 | Stop reason: ALTCHOICE

## 2021-07-27 RX ORDER — AZELASTINE HCL 205.5 UG/1
2 SPRAY NASAL 2 TIMES DAILY
Qty: 1 BOTTLE | Refills: 5 | Status: SHIPPED | OUTPATIENT
Start: 2021-07-27 | End: 2021-12-06 | Stop reason: ALTCHOICE

## 2021-07-27 RX ORDER — ZOSTER VACCINE RECOMBINANT, ADJUVANTED 50 MCG/0.5
0.5 KIT INTRAMUSCULAR ONCE
Qty: 0.5 ML | Refills: 1 | Status: SHIPPED | OUTPATIENT
Start: 2021-07-27 | End: 2021-07-27

## 2021-07-27 NOTE — PROGRESS NOTES
Danielle Michel (: 1935) is a 82 y.o. male, established patient, here for evaluation of the following chief complaint(s):  Follow-up (HTN, DM), Hypertension, and Diabetes       ASSESSMENT/PLAN:  Below is the assessment and plan developed based on review of pertinent history, physical exam, labs, studies, and medications. 1. Type 2 diabetes mellitus without complication, with long-term current use of insulin ([facility])  -     REFERRAL TO PODIATRY  2. Type 2 diabetes mellitus with chronic kidney disease, with long-term current use of insulin, unspecified CKD stage (HCC)  -     flash glucose sensor (FreeStyle Rachel 10 Day Sensor) kit; 1 Each by Does Not Apply route every fourteen (14) days. , Normal, Disp-6 Kit, R-3  3. Severe obesity (BMI 35.0-35.9 with comorbidity) ([facility] [address]  4. Snoring  -     REFERRAL TO PULMONARY DISEASE  5. Essential hypertension  -     REFERRAL TO PULMONARY DISEASE  6. Hyperlipidemia, unspecified hyperlipidemia type  -     ezetimibe (ZETIA) 10 mg tablet; TAKE 1 TABLET BY MOUTH ONCE DAILY AT BEDTIME, Normal, Disp-90 Tablet, R-3  7. Asthma due to seasonal allergies  -     azelastine (ASTEPRO) 205.5 mcg (0.15 %); 2 Sprays by Both Nostrils route two (2) times a day., Normal, Disp-1 Bottle, R-5  8. Benign prostatic hyperplasia with lower urinary tract symptoms, symptom details unspecified  -     finasteride (PROSCAR) 5 mg tablet; Take 1 Tablet by mouth daily for 30 days. , Normal, Disp-90 Tablet, R-3  9. Controlled type 2 diabetes mellitus without complication, with long-term current use of insulin ([facility]  10. Encounter for immunization  -     varicella-zoster recombinant, PF, (Shingrix, PF,) 50 mcg/0.5 mL susr injection; 0.5 mL by IntraMUSCular route once for 1 dose.  Indications: shingles vaccination, Normal, Disp-0.5 mL, R-1      Return in about 3 months (around 10/27/2021) for Hyperlipidemia, HTN, DM.      SUBJECTIVE/OBJECTIVE:  HPI  Patient presenting for hypertension followup, diabetes follow up, hyperlipidemia check. Patient reports blood pressures at home most frequently not checked. Patient has symptoms of none of the following; no chest pain, shortness of breath, weakness, orthostatic hypotension, cough, myalgias, rash, headaches, weight gain, leg swelling, palpitations, slow heart rate, fatigue, depression. Patient reports good compliance with medications, no side effects from medications noted. Current treatments include diet modification. Review of Systems   All other systems reviewed and are negative. Visit Vitals  /61 (BP 1 Location: Left arm, BP Patient Position: Sitting, BP Cuff Size: Adult)   Pulse 77   Temp 98.9 °F (37.2 °C) (Temporal)   Resp 18   Ht 5' 9\" (1.753 m)   Wt 237 lb (107.5 kg)   SpO2 98%   BMI 35.00 kg/m²     Physical Exam  Constitutional:  No acute distress  HEENT:  Head normocephalic and atraumatic. CV:  Regular rate and rhythm. No murmur. Respiratory:  Lungs clear to auscultation bilaterally  Abdomen:  Soft, non-tender. Skin:  Normal color. Warm and Dry  Extremities:  Non-tender. No pedal edema. Back:  No tenderness  Neuro:  No gross motor deficits    On this date 07/27/2021 I have spent 31 minutes reviewing previous notes, test results and face to face with the patient discussing the diagnosis and importance of compliance with the treatment plan as well as documenting on the day of the visit. An electronic signature was used to authenticate this note.   -- Bhargavi Todd NP

## 2021-07-27 NOTE — PROGRESS NOTES
Chief Complaint   Patient presents with    Follow-up     HTN, DM    Hypertension    Diabetes     1. Have you been to the ER, urgent care clinic since your last visit? Hospitalized since your last visit? No    2. Have you seen or consulted any other health care providers outside of the [address] [place] [address] since your last visit? Include any pap smears or colon screening. Yes Reason for visit: Urology by the hospital.  Dr. Sakshi Barclay retired.        Visit Vitals  /61 (BP 1 Location: Left arm, BP Patient Position: Sitting, BP Cuff Size: Adult)   Pulse 77   Temp 98.9 °F (37.2 °C) (Temporal)   Resp 18   Ht 5' 9\" (1.753 m)   Wt 237 lb (107.5 kg)   SpO2 98%   BMI 35.00 kg/m²

## 2021-07-28 ENCOUNTER — TELEPHONE (OUTPATIENT)
Dept: UROLOGY | Age: 86
End: 2021-07-28

## 2021-07-29 DIAGNOSIS — E11.22 TYPE 2 DIABETES MELLITUS WITH CHRONIC KIDNEY DISEASE, WITH LONG-TERM CURRENT USE OF INSULIN, UNSPECIFIED CKD STAGE (HCC): ICD-10-CM

## 2021-07-29 DIAGNOSIS — Z79.4 TYPE 2 DIABETES MELLITUS WITH CHRONIC KIDNEY DISEASE, WITH LONG-TERM CURRENT USE OF INSULIN, UNSPECIFIED CKD STAGE (HCC): ICD-10-CM

## 2021-07-29 DIAGNOSIS — N40.1 BENIGN PROSTATIC HYPERPLASIA WITH LOWER URINARY TRACT SYMPTOMS, SYMPTOM DETAILS UNSPECIFIED: ICD-10-CM

## 2021-07-29 RX ORDER — FINASTERIDE 5 MG/1
5 TABLET, FILM COATED ORAL DAILY
Qty: 90 TABLET | Refills: 3 | Status: SHIPPED | OUTPATIENT
Start: 2021-07-29 | End: 2021-08-28

## 2021-07-29 NOTE — TELEPHONE ENCOUNTER
Insurance will not pay for 10 day reader / sensor. Please send over the 14 day sensor / reader. Shreya Quarles

## 2021-07-30 RX ORDER — FLASH GLUCOSE SENSOR
KIT MISCELLANEOUS
Qty: 6 KIT | Refills: 5 | Status: SHIPPED | OUTPATIENT
Start: 2021-07-30 | End: 2022-01-25 | Stop reason: ALTCHOICE

## 2021-08-04 DIAGNOSIS — I10 ESSENTIAL HYPERTENSION: ICD-10-CM

## 2021-08-11 RX ORDER — AMLODIPINE AND VALSARTAN 10; 320 MG/1; MG/1
TABLET ORAL
Qty: 90 TABLET | Refills: 0 | Status: SHIPPED | OUTPATIENT
Start: 2021-08-11 | End: 2021-10-26 | Stop reason: SDUPTHER

## 2021-08-17 ENCOUNTER — OFFICE VISIT (OUTPATIENT)
Dept: PODIATRY | Age: 86
End: 2021-08-17
Payer: MEDICARE

## 2021-08-17 VITALS
SYSTOLIC BLOOD PRESSURE: 127 MMHG | BODY MASS INDEX: 35.7 KG/M2 | WEIGHT: 241 LBS | TEMPERATURE: 96.9 F | OXYGEN SATURATION: 99 % | HEART RATE: 79 BPM | DIASTOLIC BLOOD PRESSURE: 64 MMHG | HEIGHT: 69 IN

## 2021-08-17 DIAGNOSIS — E11.22 TYPE 2 DIABETES MELLITUS WITH CHRONIC KIDNEY DISEASE, WITH LONG-TERM CURRENT USE OF INSULIN, UNSPECIFIED CKD STAGE (HCC): Primary | ICD-10-CM

## 2021-08-17 DIAGNOSIS — Z79.4 TYPE 2 DIABETES MELLITUS WITH CHRONIC KIDNEY DISEASE, WITH LONG-TERM CURRENT USE OF INSULIN, UNSPECIFIED CKD STAGE (HCC): Primary | ICD-10-CM

## 2021-08-17 DIAGNOSIS — L60.3 ONYCHODYSTROPHY: ICD-10-CM

## 2021-08-17 PROCEDURE — G8752 SYS BP LESS 140: HCPCS | Performed by: PODIATRIST

## 2021-08-17 PROCEDURE — G8427 DOCREV CUR MEDS BY ELIG CLIN: HCPCS | Performed by: PODIATRIST

## 2021-08-17 PROCEDURE — G8754 DIAS BP LESS 90: HCPCS | Performed by: PODIATRIST

## 2021-08-17 PROCEDURE — G8417 CALC BMI ABV UP PARAM F/U: HCPCS | Performed by: PODIATRIST

## 2021-08-17 PROCEDURE — 11721 DEBRIDE NAIL 6 OR MORE: CPT | Performed by: PODIATRIST

## 2021-08-17 PROCEDURE — 99203 OFFICE O/P NEW LOW 30 MIN: CPT | Performed by: PODIATRIST

## 2021-08-17 PROCEDURE — G8432 DEP SCR NOT DOC, RNG: HCPCS | Performed by: PODIATRIST

## 2021-08-17 PROCEDURE — 1101F PT FALLS ASSESS-DOCD LE1/YR: CPT | Performed by: PODIATRIST

## 2021-08-17 PROCEDURE — G8536 NO DOC ELDER MAL SCRN: HCPCS | Performed by: PODIATRIST

## 2021-08-17 NOTE — PROGRESS NOTES
Chief Complaint   Patient presents with    Diabetic Foot Exam     A1C-5.7 BS-96      1. Have you been to the ER, urgent care clinic since your last visit? Hospitalized since your last visit? No    2. Have you seen or consulted any other health care providers outside of the [address] [place] [address] since your last visit? Include any pap smears or colon screening.  No  PCP-Falguni Barney NP

## 2021-08-17 NOTE — PROGRESS NOTES
[facility]    Subjective:         Patient is a 82 y.o. male who is being seen as a new pt for diabetic pedal exam.  Patient states he has close follow-up with his PCP (Deacon Davis NP). he states his last office visit with his PCP was 07/27/2021. He describes his diabetes as being under control, noting his last hemoglobin A1c was 5.7%. He denies any overt pedal pain. He denies any recent pedal trauma. He denies any systemic signs of infection but does state his nails are thick/discolored. He denies any other lower extremity complaints    Past Medical History:   Diagnosis Date    Chronic kidney disease     Diabetes ([facility]     Hypertension      Past Surgical History:   Procedure Laterality Date    HX CHOLECYSTECTOMY  2010    HX COLONOSCOPY  2013    HX UROLOGICAL  09/24/2010    CYSTO       Family History   Problem Relation Age of Onset    Heart Disease Mother     Diabetes Mother     Other Father         anerysm     Diabetes Father       Social History     Tobacco Use    Smoking status: Smoker, Current Status Unknown     Packs/day: 0.50     Years: 22.00     Pack years: 11.00     Types: Cigarettes    Smokeless tobacco: Never Used   Substance Use Topics    Alcohol use: Not Currently     No Known Allergies  Prior to Admission medications    Medication Sig Start Date End Date Taking? Authorizing Provider   amLODIPine-valsartan (EXFORGE)  mg per tablet TAKE 1 TABLET BY MOUTH EVERY DAY 8/14/12   Mariposa Enciso NP   flash glucose sensor (FreeStyle Rachel 14 Day Sensor) kit Use to check glucose readings multiple times daily. Replace sensor every 14 days. 5/30/83   Mariposa Enciso NP   finasteride (PROSCAR) 5 mg tablet Take 1 Tablet by mouth daily for 30 days.  7/29/21 8/28/21  Umair Olson NP   ezetimibe (ZETIA) 10 mg tablet TAKE 1 TABLET BY MOUTH ONCE DAILY AT BEDTIME 1/62/35   Mariposa Barney NP   azelastine (ASTEPRO) 205.5 mcg (0.15 %) 2 Sprays by Both Nostrils route two (2) times a day. 9/47/04   Mariposa Kruger NP   flash glucose sensor (FreeStyle Rachel 10 Day Sensor) kit 1 Each by Does Not Apply route every fourteen (14) days. 0/84/38   Mariposa Kruger NP   tamsulosin (Flomax) 0.4 mg capsule Take 1 Capsule by mouth daily. 7/8/21   Estefanía Madison MD   pravastatin (PRAVACHOL) 40 mg tablet Take 1 Tablet by mouth nightly. 7/0/33   Mariposa Kruger NP   flash glucose scanning reader (FreeStyle Rachel 14 Day [place]) misc 1 Each by Does Not Apply route every fourteen (14) days. 0/06/18   Linda Chu NP   naproxen (NAPROSYN) 500 mg tablet Take 1 tablet by mouth twice daily as needed 8/22/08   Linda Chu NP   pravastatin (PRAVACHOL) 40 mg tablet TAKE 1 TABLET BY MOUTH ONCE DAILY IN THE EVENING 4/79/03   Mariposa Barney NP   polyethylene glycol (MIRALAX) 17 gram packet Take 17 g by mouth daily. Provider, Historical   albuterol (PROVENTIL HFA, VENTOLIN HFA, PROAIR HFA) 90 mcg/actuation inhaler Take  by inhalation. Provider, Historical   aspirin delayed-release (Ecotrin Low Strength) 81 mg tablet Take  by mouth daily. Provider, Historical   ferrous sulfate 325 mg (65 mg iron) tablet Take  by mouth Daily (before breakfast). Provider, Historical   furosemide (LASIX) 40 mg tablet Take  by mouth daily. Provider, Historical   insulin lispro (HumaLOG KwikPen Insulin) 100 unit/mL kwikpen by SubCUTAneous route. Provider, Historical   SITagliptin (Januvia) 50 mg tablet Take 50 mg by mouth daily. Provider, Historical   insulin glargine (Lantus U-100 Insulin) 100 unit/mL injection by SubCUTAneous route nightly. Provider, Historical   timolol (TIMOPTIC) 0.5 % ophthalmic solution 1 Drop two (2) times a day. Provider, Historical   cholecalciferol, vitamin D3, (VITAMIN D3 PO) Take  by mouth. Provider, Historical   glucose blood VI test strips (Accu-Chek Ghazal Plus test strp) strip by Does Not Apply route See Admin Instructions.     Provider, Historical lancets (Accu-Chek Softclix Lancets) misc by Does Not Apply route. Provider, Historical   alcohol swabs (Alcohol Prep Pads) padm by Apply Externally route. Provider, Historical   Insulin Needles, Disposable, (BD Ultra-Fine Mini Pen Needle) 31 gauge x 3/16\" ndle by Does Not Apply route. Provider, Historical   Insulin Syringe-Needle U-100 (Comfort EZ Insulin Syringe) 0.5 mL 30 gauge x 5/16\" syrg by Does Not Apply route. Provider, Historical       Review of Systems   Constitutional: Negative. HENT: Negative. Eyes: Negative. Respiratory: Negative. Cardiovascular: Positive for leg swelling. Gastrointestinal: Negative. Endocrine: Negative. Genitourinary: Positive for frequency. Musculoskeletal: Positive for arthralgias. Skin: Negative. Allergic/Immunologic: Positive for immunocompromised state. Neurological: Positive for numbness. Hematological: Negative. Psychiatric/Behavioral: Negative. All other systems reviewed and are negative. Objective:     Visit Vitals  /64 (BP 1 Location: Right upper arm, BP Patient Position: Sitting, BP Cuff Size: Large adult)   Pulse 79   Temp 96.9 °F (36.1 °C) (Temporal)   Ht 5' 9\" (1.753 m)   Wt 241 lb (109.3 kg)   SpO2 99%   BMI 35.59 kg/m²       Physical Exam  Vitals reviewed. Constitutional:       Appearance: He is morbidly obese. Cardiovascular:      Pulses:           Dorsalis pedis pulses are 2+ on the right side and 2+ on the left side. Posterior tibial pulses are 2+ on the right side and 2+ on the left side. Pulmonary:      Effort: Pulmonary effort is normal.   Musculoskeletal:      Right lower leg: Edema present. Left lower leg: Edema present. Right foot: Normal range of motion. No deformity or bunion. Left foot: Normal range of motion. No deformity or bunion. Feet:      Right foot:      Protective Sensation: 10 sites tested. 10 sites sensed.       Skin integrity: Skin integrity normal. Toenail Condition: Right toenails are abnormally thick. Left foot:      Protective Sensation: 10 sites tested. 10 sites sensed. Skin integrity: Skin integrity normal.      Toenail Condition: Left toenails are abnormally thick. Lymphadenopathy:      Lower Body: No right inguinal adenopathy. No left inguinal adenopathy. Skin:     General: Skin is warm. Capillary Refill: Capillary refill takes 2 to 3 seconds. Comments: Absent pedal hair growth with hyperpigmentation noted to the skin   Neurological:      Mental Status: He is alert and oriented to person, place, and time. Comments: Paresthesias noted to bilateral lower extremities   Psychiatric:         Mood and Affect: Mood and affect normal.         Behavior: Behavior is cooperative. Data Review: No results found for this or any previous visit (from the past 24 hour(s)). Impression:       ICD-10-CM ICD-9-CM    1. Type 2 diabetes mellitus with chronic kidney disease, with long-term current use of insulin, unspecified CKD stage ([facility]  E11.22 250.40     Z79.4 585. 9      V58.67    2. Onychodystrophy  L60.3 703.8          Recommendation:     Patient seen and evaluated in the office  Discussed and educated patient regarding their current medical condition. Instructed patient to monitor their feet daily, be compliant with all medications and wear supportive shoe gear. A sharp toenail plate debridement was performed to all 10 pedal digits with a nail nipper without incident. Patient tolerated well no dressing was needed        Jose G Martinez, [facility], [address] [place] [address] and [address] [facility], [address] [place] [address]  O: [phone]  F: [phone]  C: [phone]

## 2021-08-25 DIAGNOSIS — I10 ESSENTIAL HYPERTENSION: Primary | ICD-10-CM

## 2021-08-25 DIAGNOSIS — N40.0 BENIGN PROSTATIC HYPERPLASIA, UNSPECIFIED WHETHER LOWER URINARY TRACT SYMPTOMS PRESENT: ICD-10-CM

## 2021-08-26 RX ORDER — FUROSEMIDE 40 MG/1
TABLET ORAL
Qty: 180 TABLET | Refills: 0 | Status: SHIPPED | OUTPATIENT
Start: 2021-08-26 | End: 2022-01-25 | Stop reason: ALTCHOICE

## 2021-09-03 RX ORDER — FUROSEMIDE 40 MG/1
40 TABLET ORAL DAILY
Qty: 90 TABLET | Refills: 1 | Status: SHIPPED | OUTPATIENT
Start: 2021-09-03 | End: 2021-09-10 | Stop reason: SDUPTHER

## 2021-09-03 RX ORDER — TAMSULOSIN HYDROCHLORIDE 0.4 MG/1
0.4 CAPSULE ORAL DAILY
Qty: 90 CAPSULE | Refills: 3 | Status: SHIPPED | OUTPATIENT
Start: 2021-09-03 | End: 2022-08-26 | Stop reason: SDUPTHER

## 2021-09-09 DIAGNOSIS — I10 ESSENTIAL HYPERTENSION: ICD-10-CM

## 2021-09-14 RX ORDER — FUROSEMIDE 40 MG/1
40 TABLET ORAL DAILY
Qty: 90 TABLET | Refills: 1 | Status: SHIPPED | OUTPATIENT
Start: 2021-09-14 | End: 2021-12-06

## 2021-09-14 RX ORDER — POTASSIUM CHLORIDE 20 MEQ/1
1 TABLET, EXTENDED RELEASE ORAL DAILY
COMMUNITY
Start: 2021-05-28 | End: 2021-09-14 | Stop reason: SDUPTHER

## 2021-09-14 RX ORDER — POTASSIUM CHLORIDE 20 MEQ/1
20 TABLET, EXTENDED RELEASE ORAL DAILY
Qty: 90 TABLET | Refills: 1 | Status: SHIPPED | OUTPATIENT
Start: 2021-09-14 | End: 2021-12-06 | Stop reason: SDUPTHER

## 2021-09-14 NOTE — TELEPHONE ENCOUNTER
Pt stopped by pharmacy told him that you discontinued his Potassium. He needs this refilled only has 2 tablets left.

## 2021-09-29 ENCOUNTER — TELEPHONE (OUTPATIENT)
Dept: FAMILY MEDICINE CLINIC | Age: 86
End: 2021-09-29

## 2021-09-29 NOTE — TELEPHONE ENCOUNTER
Ivana Cowden from [address] [place] [address] called and said he fax over a form for  some medical supply for pt. He was just wondering have you receive them yet?  KIM    Ph:# [phone]  Fax:# [phone]

## 2021-10-26 ENCOUNTER — OFFICE VISIT (OUTPATIENT)
Dept: FAMILY MEDICINE CLINIC | Age: 86
End: 2021-10-26
Payer: MEDICARE

## 2021-10-26 VITALS
OXYGEN SATURATION: 97 % | WEIGHT: 238.4 LBS | DIASTOLIC BLOOD PRESSURE: 64 MMHG | SYSTOLIC BLOOD PRESSURE: 128 MMHG | TEMPERATURE: 98.5 F | HEART RATE: 81 BPM | BODY MASS INDEX: 35.21 KG/M2

## 2021-10-26 DIAGNOSIS — I10 ESSENTIAL HYPERTENSION: ICD-10-CM

## 2021-10-26 DIAGNOSIS — E78.5 HYPERLIPIDEMIA, UNSPECIFIED HYPERLIPIDEMIA TYPE: ICD-10-CM

## 2021-10-26 DIAGNOSIS — Z79.4 TYPE 2 DIABETES MELLITUS WITHOUT COMPLICATION, WITH LONG-TERM CURRENT USE OF INSULIN (HCC): ICD-10-CM

## 2021-10-26 DIAGNOSIS — N40.0 BENIGN PROSTATIC HYPERPLASIA WITHOUT LOWER URINARY TRACT SYMPTOMS: Primary | ICD-10-CM

## 2021-10-26 DIAGNOSIS — E11.9 TYPE 2 DIABETES MELLITUS WITHOUT COMPLICATION, WITH LONG-TERM CURRENT USE OF INSULIN (HCC): ICD-10-CM

## 2021-10-26 PROCEDURE — G8536 NO DOC ELDER MAL SCRN: HCPCS | Performed by: NURSE PRACTITIONER

## 2021-10-26 PROCEDURE — G8427 DOCREV CUR MEDS BY ELIG CLIN: HCPCS | Performed by: NURSE PRACTITIONER

## 2021-10-26 PROCEDURE — G8432 DEP SCR NOT DOC, RNG: HCPCS | Performed by: NURSE PRACTITIONER

## 2021-10-26 PROCEDURE — G8417 CALC BMI ABV UP PARAM F/U: HCPCS | Performed by: NURSE PRACTITIONER

## 2021-10-26 PROCEDURE — G8752 SYS BP LESS 140: HCPCS | Performed by: NURSE PRACTITIONER

## 2021-10-26 PROCEDURE — 1101F PT FALLS ASSESS-DOCD LE1/YR: CPT | Performed by: NURSE PRACTITIONER

## 2021-10-26 PROCEDURE — 99214 OFFICE O/P EST MOD 30 MIN: CPT | Performed by: NURSE PRACTITIONER

## 2021-10-26 PROCEDURE — G8754 DIAS BP LESS 90: HCPCS | Performed by: NURSE PRACTITIONER

## 2021-10-26 RX ORDER — AMLODIPINE AND VALSARTAN 10; 320 MG/1; MG/1
1 TABLET ORAL DAILY
Qty: 90 TABLET | Refills: 3 | Status: SHIPPED | OUTPATIENT
Start: 2021-10-26

## 2021-10-26 RX ORDER — PRAVASTATIN SODIUM 40 MG/1
40 TABLET ORAL
Qty: 90 TABLET | Refills: 3 | Status: SHIPPED | OUTPATIENT
Start: 2021-10-26 | End: 2021-12-06

## 2021-10-26 RX ORDER — FINASTERIDE 1 MG/1
5 TABLET, FILM COATED ORAL DAILY
COMMUNITY
End: 2021-10-26

## 2021-10-26 RX ORDER — FINASTERIDE 5 MG/1
5 TABLET, FILM COATED ORAL DAILY
Qty: 90 TABLET | Refills: 3 | Status: SHIPPED | OUTPATIENT
Start: 2021-10-26 | End: 2021-12-06 | Stop reason: SDUPTHER

## 2021-10-26 RX ORDER — FINASTERIDE 1 MG/1
5 TABLET, FILM COATED ORAL DAILY
Status: CANCELLED | OUTPATIENT
Start: 2021-10-26

## 2021-10-26 RX ORDER — FINASTERIDE 5 MG/1
5 TABLET, FILM COATED ORAL DAILY
COMMUNITY
End: 2021-10-26 | Stop reason: SDUPTHER

## 2021-10-26 NOTE — PROGRESS NOTES
Irvin Valencia (: 1935) is a 82 y.o. male, established patient, here for evaluation of the following chief complaint(s):  Follow Up Chronic Condition, Hypertension, and Diabetes    ASSESSMENT/PLAN:  Below is the assessment and plan developed based on review of pertinent history, physical exam, labs, studies, and medications. 1. Benign prostatic hyperplasia without lower urinary tract symptoms  -     finasteride (PROSCAR) 5 mg tablet; Take 1 Tablet by mouth daily. Indications: enlarged prostate with urination problem, Normal, Disp-90 Tablet, R-3  2. Hyperlipidemia, unspecified hyperlipidemia type  -     pravastatin (PRAVACHOL) 40 mg tablet; Take 1 Tablet by mouth nightly. Indications: excessive fat in the blood, Normal, Disp-90 Tablet, R-3  -     CBC WITH AUTOMATED DIFF  -     METABOLIC PANEL, COMPREHENSIVE  -     LIPID PANEL  3. Essential hypertension  -     amLODIPine-valsartan (EXFORGE)  mg per tablet; Take 1 Tablet by mouth daily. Indications: high blood pressure, Normal, Disp-90 Tablet, R-3  -     CBC WITH AUTOMATED DIFF  -     METABOLIC PANEL, COMPREHENSIVE  -     LIPID PANEL  4. Type 2 diabetes mellitus without complication, with long-term current use of insulin (HCC)  -     CBC WITH AUTOMATED DIFF  -     METABOLIC PANEL, COMPREHENSIVE  -     LIPID PANEL  -     HEMOGLOBIN A1C WITH EAG    Advised to call cardiology after test results from pulmonology for following up in fatigue with exertion, possible candidate for calcium score CT    Return in about 3 months (around 2022) for Hyperlipidemia, HTN, DM post lung and cardio for SOB. SUBJECTIVE/OBJECTIVE:  HPI   Saw pulmonologist  And received pulmonary function test was normal but still very fatigue with exertion. Does have follow up with pulmonology but not cardiology as of yet. Patient presenting for hypertension followup, diabetes follow up, hyperlipidemia check.  Patient reports blood pressures at home most frequently normal. Patient has symptoms of none of the following; no chest pain, shortness of breath, weakness, orthostatic hypotension, cough, myalgias, rash, headaches, weight gain, leg swelling, palpitations, slow heart rate, fatigue, depression. Patient reports good compliance with medications, no side effects from medications noted. Current treatments include diet modification, weight loss. Review of Systems  All other systems reviewed and are negative. Visit Vitals  /64 (BP 1 Location: Right arm, BP Patient Position: Sitting, BP Cuff Size: Adult)   Pulse 81   Temp 98.5 °F (36.9 °C)   Wt 238 lb 6.4 oz (108.1 kg)   SpO2 97%   BMI 35.21 kg/m²       Physical Exam  Constitutional:  No acute distress  HEENT:  Head normocephalic and atraumatic. CV:  Regular rate and rhythm. No murmur. Respiratory:  Lungs clear to auscultation bilaterally  Abdomen:  Soft, non-tender. Skin:  Normal color. Warm and Dry  Extremities:  Non-tender. No pedal edema. Back:  No tenderness  Neuro:  No gross motor deficits    On this date 10/26/2021 I have spent 34 minutes reviewing previous notes, test results and face to face with the patient discussing the diagnosis and importance of compliance with the treatment plan as well as documenting on the day of the visit. Aspects of this note may have been generated using voice recognition software. Despite editing, there may be some syntax errors. An electronic signature was used to authenticate this note.   -- Estephania Mata NP

## 2021-10-26 NOTE — PROGRESS NOTES
Chief Complaint   Patient presents with    Follow Up Chronic Condition    Hypertension    Diabetes     1. Have you been to the ER, urgent care clinic since your last visit? Hospitalized since your last visit? No    2. Have you seen or consulted any other health care providers outside of the [address] [place] [address] since your last visit? Include any pap smears or colon screening.  pulmonologist yesterday  Visit Vitals  /64 (BP 1 Location: Right arm, BP Patient Position: Sitting, BP Cuff Size: Adult)   Pulse 81   Temp 98.5 °F (36.9 °C)   Wt 238 lb 6.4 oz (108.1 kg)   SpO2 97%   BMI 35.21 kg/m²

## 2021-11-03 ENCOUNTER — TELEPHONE (OUTPATIENT)
Dept: FAMILY MEDICINE CLINIC | Age: 86
End: 2021-11-03

## 2021-11-03 NOTE — TELEPHONE ENCOUNTER
----- Message from June Bob sent at 11/3/2021 10:52 AM EDT -----  Subject: Message to Provider    QUESTIONS  Information for Provider? Purnima Cota is requesting the last office visit   notes and the medication list of the patient. Fax # [phone] and the   phone # is [phone].  ---------------------------------------------------------------------------  --------------  [address]  What is the best way for the office to contact you? OK to leave message on   voicemail  Preferred Call Back Phone Number? [phone]  ---------------------------------------------------------------------------  --------------  SCRIPT ANSWERS  Relationship to Patient? Third Party  Representative Name?  [facility]ima [facility]

## 2021-11-19 LAB
ALBUMIN SERPL-MCNC: 3.8 G/DL (ref 3.6–4.6)
ALBUMIN/GLOB SERPL: 1.5 {RATIO} (ref 1.2–2.2)
ALP SERPL-CCNC: 95 IU/L (ref 44–121)
ALT SERPL-CCNC: 11 IU/L (ref 0–44)
AST SERPL-CCNC: 10 IU/L (ref 0–40)
BASOPHILS # BLD AUTO: 0 X10E3/UL (ref 0–0.2)
BASOPHILS NFR BLD AUTO: 0 %
BILIRUB SERPL-MCNC: 0.6 MG/DL (ref 0–1.2)
BUN SERPL-MCNC: 22 MG/DL (ref 8–27)
BUN/CREAT SERPL: 11 (ref 10–24)
CALCIUM SERPL-MCNC: 8.8 MG/DL (ref 8.6–10.2)
CHLORIDE SERPL-SCNC: 104 MMOL/L (ref 96–106)
CHOLEST SERPL-MCNC: 135 MG/DL (ref 100–199)
CO2 SERPL-SCNC: 22 MMOL/L (ref 20–29)
CREAT SERPL-MCNC: 1.95 MG/DL (ref 0.76–1.27)
EOSINOPHIL # BLD AUTO: 0.2 X10E3/UL (ref 0–0.4)
EOSINOPHIL NFR BLD AUTO: 2 %
ERYTHROCYTE [DISTWIDTH] IN BLOOD BY AUTOMATED COUNT: 13 % (ref 11.6–15.4)
EST. AVERAGE GLUCOSE BLD GHB EST-MCNC: 117 MG/DL
GLOBULIN SER CALC-MCNC: 2.6 G/DL (ref 1.5–4.5)
GLUCOSE SERPL-MCNC: 125 MG/DL (ref 65–99)
HBA1C MFR BLD: 5.7 % (ref 4.8–5.6)
HCT VFR BLD AUTO: 32.8 % (ref 37.5–51)
HDLC SERPL-MCNC: 48 MG/DL
HGB BLD-MCNC: 11.3 G/DL (ref 13–17.7)
IMM GRANULOCYTES # BLD AUTO: 0 X10E3/UL (ref 0–0.1)
IMM GRANULOCYTES NFR BLD AUTO: 0 %
LDLC SERPL CALC-MCNC: 73 MG/DL (ref 0–99)
LYMPHOCYTES # BLD AUTO: 1.4 X10E3/UL (ref 0.7–3.1)
LYMPHOCYTES NFR BLD AUTO: 16 %
MCH RBC QN AUTO: 31.5 PG (ref 26.6–33)
MCHC RBC AUTO-ENTMCNC: 34.5 G/DL (ref 31.5–35.7)
MCV RBC AUTO: 91 FL (ref 79–97)
MONOCYTES # BLD AUTO: 0.5 X10E3/UL (ref 0.1–0.9)
MONOCYTES NFR BLD AUTO: 6 %
NEUTROPHILS # BLD AUTO: 6.6 X10E3/UL (ref 1.4–7)
NEUTROPHILS NFR BLD AUTO: 76 %
PLATELET # BLD AUTO: 152 X10E3/UL (ref 150–450)
POTASSIUM SERPL-SCNC: 4.1 MMOL/L (ref 3.5–5.2)
PROT SERPL-MCNC: 6.4 G/DL (ref 6–8.5)
RBC # BLD AUTO: 3.59 X10E6/UL (ref 4.14–5.8)
SODIUM SERPL-SCNC: 142 MMOL/L (ref 134–144)
TRIGL SERPL-MCNC: 69 MG/DL (ref 0–149)
VLDLC SERPL CALC-MCNC: 14 MG/DL (ref 5–40)
WBC # BLD AUTO: 8.7 X10E3/UL (ref 3.4–10.8)

## 2021-12-02 ENCOUNTER — TELEPHONE (OUTPATIENT)
Dept: FAMILY MEDICINE CLINIC | Age: 86
End: 2021-12-02

## 2021-12-02 DIAGNOSIS — I10 ESSENTIAL HYPERTENSION: ICD-10-CM

## 2021-12-02 DIAGNOSIS — E78.5 HYPERLIPIDEMIA, UNSPECIFIED HYPERLIPIDEMIA TYPE: ICD-10-CM

## 2021-12-02 DIAGNOSIS — N40.0 BENIGN PROSTATIC HYPERPLASIA WITHOUT LOWER URINARY TRACT SYMPTOMS: ICD-10-CM

## 2021-12-06 RX ORDER — FINASTERIDE 5 MG/1
5 TABLET, FILM COATED ORAL DAILY
Qty: 90 TABLET | Refills: 3 | Status: SHIPPED | OUTPATIENT
Start: 2021-12-06 | End: 2022-01-25 | Stop reason: ALTCHOICE

## 2021-12-06 RX ORDER — PRAVASTATIN SODIUM 40 MG/1
40 TABLET ORAL
Qty: 90 TABLET | Refills: 3 | Status: SHIPPED | OUTPATIENT
Start: 2021-12-06

## 2021-12-06 RX ORDER — FUROSEMIDE 40 MG/1
40 TABLET ORAL DAILY
Qty: 90 TABLET | Refills: 1 | Status: SHIPPED | OUTPATIENT
Start: 2021-12-06 | End: 2022-01-25 | Stop reason: DRUGHIGH

## 2021-12-06 RX ORDER — POTASSIUM CHLORIDE 20 MEQ/1
20 TABLET, EXTENDED RELEASE ORAL DAILY
Qty: 90 TABLET | Refills: 1 | Status: SHIPPED | OUTPATIENT
Start: 2021-12-06 | End: 2022-05-08

## 2021-12-06 NOTE — TELEPHONE ENCOUNTER
Identifying Data:  82 y.o. , Jacek Diaz , male     HISTORICAL DATA (REVIEWED TODAY):  ALLERGIES-    No Known Allergies    MEDICATION AS OF LAST RECONCILIATION (NOT INCLUDING CHANGES MADE TODAY):    Current Outpatient Medications on File Prior to Visit   Medication Sig Dispense Refill    pravastatin (PRAVACHOL) 40 mg tablet Take 1 Tablet by mouth nightly. Indications: excessive fat in the blood 90 Tablet 3    amLODIPine-valsartan (EXFORGE)  mg per tablet Take 1 Tablet by mouth daily. Indications: high blood pressure 90 Tablet 3    finasteride (PROSCAR) 5 mg tablet Take 1 Tablet by mouth daily. Indications: enlarged prostate with urination problem 90 Tablet 3    furosemide (LASIX) 40 mg tablet Take 1 Tablet by mouth daily. Indications: visible water retention 90 Tablet 1    potassium chloride (K-DUR, KLOR-CON) 20 mEq tablet Take 1 Tablet by mouth daily. 90 Tablet 1    tamsulosin (Flomax) 0.4 mg capsule Take 1 Capsule by mouth daily. 90 Capsule 3    furosemide (LASIX) 40 mg tablet TAKE 1 TABLET BY MOUTH TWICE DAILY AS NEEDED FOR SWELLING (Patient not taking: Reported on 10/26/2021) 180 Tablet 0    flash glucose sensor (FreeStyle Rachel 14 Day Sensor) kit Use to check glucose readings multiple times daily. Replace sensor every 14 days. (Patient not taking: Reported on 10/26/2021) 6 Kit 5    ezetimibe (ZETIA) 10 mg tablet TAKE 1 TABLET BY MOUTH ONCE DAILY AT BEDTIME 90 Tablet 3    azelastine (ASTEPRO) 205.5 mcg (0.15 %) 2 Sprays by Both Nostrils route two (2) times a day. (Patient not taking: Reported on 10/26/2021) 1 Bottle 5    flash glucose sensor (FreeStyle Rachel 10 Day Sensor) kit 1 Each by Does Not Apply route every fourteen (14) days. (Patient not taking: Reported on 10/26/2021) 6 Kit 3    flash glucose scanning reader (FreeStyle Rachel 14 Day [place]) misc 1 Each by Does Not Apply route every fourteen (14) days.  (Patient not taking: Reported on 10/26/2021) 6 Each 3    naproxen (NAPROSYN) 500 mg tablet Take 1 tablet by mouth twice daily as needed 60 Tab 5    pravastatin (PRAVACHOL) 40 mg tablet TAKE 1 TABLET BY MOUTH ONCE DAILY IN THE EVENING 90 Tab 0    polyethylene glycol (MIRALAX) 17 gram packet Take 17 g by mouth daily.  albuterol (PROVENTIL HFA, VENTOLIN HFA, PROAIR HFA) 90 mcg/actuation inhaler Take  by inhalation. (Patient not taking: Reported on 10/26/2021)      aspirin delayed-release (Ecotrin Low Strength) 81 mg tablet Take  by mouth daily.  ferrous sulfate 325 mg (65 mg iron) tablet Take  by mouth Daily (before breakfast).  insulin lispro (HumaLOG KwikPen Insulin) 100 unit/mL kwikpen by SubCUTAneous route.  SITagliptin (Januvia) 50 mg tablet Take 50 mg by mouth daily.  insulin glargine (Lantus U-100 Insulin) 100 unit/mL injection by SubCUTAneous route nightly.  timolol (TIMOPTIC) 0.5 % ophthalmic solution 1 Drop two (2) times a day.  cholecalciferol, vitamin D3, (VITAMIN D3 PO) Take  by mouth.  glucose blood VI test strips (Accu-Chek Ghazal Plus test strp) strip by Does Not Apply route See Admin Instructions.  lancets (Accu-Chek Softclix Lancets) misc by Does Not Apply route.  alcohol swabs (Alcohol Prep Pads) padm by Apply Externally route.  Insulin Needles, Disposable, (BD Ultra-Fine Mini Pen Needle) 31 gauge x 3/16\" ndle by Does Not Apply route.  Insulin Syringe-Needle U-100 (Comfort EZ Insulin Syringe) 0.5 mL 30 gauge x 5/16\" syrg by Does Not Apply route. No current facility-administered medications on file prior to visit.        PAST MEDICAL HISTORY:    Patient Active Problem List   Diagnosis Code    BPH (benign prostatic hyperplasia) N40.0    Increased frequency of urination R35.0    Chronic kidney disease, stage 3 (HCC) N18.30    Coronary arteriosclerosis I25.10    Type 2 diabetes mellitus with chronic kidney disease ([facility] E11.22    Edema of lower extremity R60.0    Essential hypertension I10    Hyperlipidemia E78.5    Hypokalemia E87.6    Iron deficiency anemia D50.9    Memory impairment R41.3    Diabetes mellitus type 2, uncontrolled (HCC) E11.65    [facility] Y91.3       ASSESSMENT AND PLAN:      ICD-10-CM ICD-9-CM    1. Essential hypertension  I10 401.9 furosemide (LASIX) 40 mg tablet   2. Hyperlipidemia, unspecified hyperlipidemia type  E78.5 272.4 pravastatin (PRAVACHOL) 40 mg tablet   3.  Benign prostatic hyperplasia without lower urinary tract symptoms  N40.0 600.00 finasteride (PROSCAR) 5 mg tablet             Guillermo Quintanilla, DO

## 2021-12-08 ENCOUNTER — TRANSCRIBE ORDER (OUTPATIENT)
Dept: REGISTRATION | Age: 86
End: 2021-12-08

## 2021-12-08 ENCOUNTER — HOSPITAL ENCOUNTER (OUTPATIENT)
Dept: GENERAL RADIOLOGY | Age: 86
Discharge: HOME | End: 2021-12-08
Payer: MEDICARE

## 2021-12-08 DIAGNOSIS — R06.02 SHORTNESS OF BREATH: Primary | ICD-10-CM

## 2021-12-08 DIAGNOSIS — R06.02 SHORTNESS OF BREATH: ICD-10-CM

## 2021-12-08 PROCEDURE — 71046 X-RAY EXAM CHEST 2 VIEWS: CPT

## 2021-12-14 ENCOUNTER — TELEPHONE (OUTPATIENT)
Dept: FAMILY MEDICINE CLINIC | Age: 86
End: 2021-12-14

## 2021-12-14 NOTE — TELEPHONE ENCOUNTER
Patient would like to know about his insulin he participates in a program [id number] and he would like to if we got the application for the docotr to fill out and then  him to sign so he can get his medication

## 2022-01-05 ENCOUNTER — TRANSCRIBE ORDER (OUTPATIENT)
Dept: SCHEDULING | Age: 87
End: 2022-01-05

## 2022-01-05 DIAGNOSIS — R06.02 SHORTNESS OF BREATH: Primary | ICD-10-CM

## 2022-01-18 ENCOUNTER — TELEPHONE (OUTPATIENT)
Dept: FAMILY MEDICINE CLINIC | Age: 87
End: 2022-01-18

## 2022-01-18 NOTE — TELEPHONE ENCOUNTER
Patient called and stated he sent in paperwork for his insulin and he has not received the medication yet. Patient states he is almost out of it and needs the medication ordered. There is paperwork in .

## 2022-01-19 ENCOUNTER — TELEPHONE (OUTPATIENT)
Dept: FAMILY MEDICINE CLINIC | Age: 87
End: 2022-01-19

## 2022-01-19 NOTE — TELEPHONE ENCOUNTER
Patient came by the office about his mail order papers for his insulin. Patient has not had any meds in 4 days . Can you please call an order to [facility] in [place] for Darron until his order comes in.

## 2022-01-25 ENCOUNTER — OFFICE VISIT (OUTPATIENT)
Dept: FAMILY MEDICINE CLINIC | Age: 87
End: 2022-01-25
Payer: MEDICARE

## 2022-01-25 VITALS
DIASTOLIC BLOOD PRESSURE: 55 MMHG | SYSTOLIC BLOOD PRESSURE: 115 MMHG | RESPIRATION RATE: 20 BRPM | OXYGEN SATURATION: 98 % | BODY MASS INDEX: 35.71 KG/M2 | WEIGHT: 241.1 LBS | HEART RATE: 88 BPM | TEMPERATURE: 97.1 F | HEIGHT: 69 IN

## 2022-01-25 DIAGNOSIS — N18.30 STAGE 3 CHRONIC KIDNEY DISEASE, UNSPECIFIED WHETHER STAGE 3A OR 3B CKD (HCC): ICD-10-CM

## 2022-01-25 DIAGNOSIS — Z79.4 TYPE 2 DIABETES MELLITUS WITH CHRONIC KIDNEY DISEASE, WITH LONG-TERM CURRENT USE OF INSULIN, UNSPECIFIED CKD STAGE (HCC): ICD-10-CM

## 2022-01-25 DIAGNOSIS — E11.22 TYPE 2 DIABETES MELLITUS WITH CHRONIC KIDNEY DISEASE, WITH LONG-TERM CURRENT USE OF INSULIN, UNSPECIFIED CKD STAGE (HCC): ICD-10-CM

## 2022-01-25 DIAGNOSIS — E66.01 SEVERE OBESITY (BMI 35.0-35.9 WITH COMORBIDITY) (HCC): ICD-10-CM

## 2022-01-25 DIAGNOSIS — I10 ESSENTIAL HYPERTENSION: ICD-10-CM

## 2022-01-25 PROCEDURE — 99214 OFFICE O/P EST MOD 30 MIN: CPT | Performed by: NURSE PRACTITIONER

## 2022-01-25 PROCEDURE — G8752 SYS BP LESS 140: HCPCS | Performed by: NURSE PRACTITIONER

## 2022-01-25 PROCEDURE — 1101F PT FALLS ASSESS-DOCD LE1/YR: CPT | Performed by: NURSE PRACTITIONER

## 2022-01-25 PROCEDURE — G8754 DIAS BP LESS 90: HCPCS | Performed by: NURSE PRACTITIONER

## 2022-01-25 PROCEDURE — G8427 DOCREV CUR MEDS BY ELIG CLIN: HCPCS | Performed by: NURSE PRACTITIONER

## 2022-01-25 PROCEDURE — G8432 DEP SCR NOT DOC, RNG: HCPCS | Performed by: NURSE PRACTITIONER

## 2022-01-25 PROCEDURE — G8417 CALC BMI ABV UP PARAM F/U: HCPCS | Performed by: NURSE PRACTITIONER

## 2022-01-25 PROCEDURE — G8536 NO DOC ELDER MAL SCRN: HCPCS | Performed by: NURSE PRACTITIONER

## 2022-01-25 RX ORDER — FUROSEMIDE 40 MG/1
40 TABLET ORAL DAILY
Qty: 90 TABLET | Refills: 1 | Status: SHIPPED | OUTPATIENT
Start: 2022-01-25

## 2022-01-25 RX ORDER — INSULIN GLARGINE 100 [IU]/ML
40 INJECTION, SOLUTION SUBCUTANEOUS
Qty: 6 ML | Refills: 0 | Status: SHIPPED | OUTPATIENT
Start: 2022-01-25 | End: 2022-05-08

## 2022-01-25 NOTE — PROGRESS NOTES
1. Have you been to the ER, urgent care clinic since your last visit? Hospitalized since your last visit? No    2. Have you seen or consulted any other health care providers outside of the [address] [place] [address] since your last visit? Include any pap smears or colon screening. No     PT states he is here for a follow up. C/o of SOB after 10 mins of activity. Hes seeing a pulmonologist and cardioglist. PFT appointment 1/31/22. Also has sleep study schedule next month.     Chief Complaint   Patient presents with    Follow-up    Cholesterol Problem    Diabetes    Breathing Problem     pt states he gets SOB after 10mins physical movement     Visit Vitals  BP (!) 115/55 (BP 1 Location: Right upper arm, BP Patient Position: Sitting)   Pulse 88   Temp 97.1 °F (36.2 °C) (Temporal)   Resp 20   Ht 5' 9\" (1.753 m)   Wt 241 lb 1.6 oz (109.4 kg)   SpO2 98%   BMI 35.60 kg/m²

## 2022-01-25 NOTE — PROGRESS NOTES
[place] Staff (: 1935) is a 82 y.o. male, established patient, here for evaluation of the following chief complaint(s):  Follow-up, Cholesterol Problem, Diabetes, and Breathing Problem (pt states he gets SOB after 10mins physical movement)    ASSESSMENT/PLAN:  Below is the assessment and plan developed based on review of pertinent history, physical exam, labs, studies, and medications. 1. Type 2 diabetes mellitus with chronic kidney disease, with long-term current use of insulin, unspecified CKD stage (HCC)  -     insulin glargine (Basaglar KwikPen U-100 Insulin) 100 unit/mL (3 mL) inpn; 40 Units by SubCUTAneous route nightly. Indications: type 2 diabetes mellitus, Normal, Disp-6 mL, R-0  2. Stage 3 chronic kidney disease, unspecified whether stage 3a or 3b CKD ([facility]  3. Severe obesity (BMI 35.0-35.9 with comorbidity) ([facility]  4. Essential hypertension  -     furosemide (LASIX) 40 mg tablet; Take 1 Tablet by mouth daily. Indications: visible water retention, Normal, Disp-90 Tablet, R-1  BP slightly elevated but no changes will be made today as he will be going to cardiology soon, however should he develop any symptoms CP, SOB, Dizziness, confusion, call 911, or call 911. Will not make any changes to current regimen    Pending results from specialists    Should SOB worsen, he is to call us or go to urgent care or ER for further evaluation. Return in about 3 months (around 2022) for post lung and cardio, sleep study. SUBJECTIVE/OBJECTIVE:  HPI    Getting sleep study 22, has CT of the chest 22. Seeing cardiologist and will follow up after sleep study, and will get stress test done  Still very SOB with activities, can tolerate 10 minutes then fatigued, rest improves. No cp, dizziness, palpitations. Glucose in the am 105 - 110, evening 146    Review of Systems  All other systems reviewed and are negative.   Visit Vitals  BP (!) 115/55 (BP 1 Location: Right upper arm, BP Patient Position: Sitting)   Pulse 88   Temp 97.1 °F (36.2 °C) (Temporal)   Resp 20   Ht 5' 9\" (1.753 m)   Wt 241 lb 1.6 oz (109.4 kg)   SpO2 98%   BMI 35.60 kg/m²       Physical Exam  Constitutional:  No acute distress  HEENT:  Head normocephalic and atraumatic. CV:  Regular rate and rhythm. No murmur. Respiratory:  Lungs clear to auscultation bilaterally  Abdomen:  Soft, non-tender. Skin:  Normal color. Warm and Dry  Extremities:  Non-tender. No pedal edema. Back:  No tenderness  Neuro:  No gross motor deficits    On this date 01/25/2022 I have spent 36 minutes reviewing previous notes, test results and face to face with the patient discussing the diagnosis and importance of compliance with the treatment plan as well as documenting on the day of the visit. Aspects of this note may have been generated using voice recognition software. Despite editing, there may be some syntax errors. An electronic signature was used to authenticate this note.   -- Brittney Grider, HOSSEIN

## 2022-01-31 ENCOUNTER — HOSPITAL ENCOUNTER (OUTPATIENT)
Dept: CT IMAGING | Age: 87
Discharge: HOME | End: 2022-01-31
Attending: INTERNAL MEDICINE
Payer: MEDICARE

## 2022-01-31 DIAGNOSIS — R06.02 SHORTNESS OF BREATH: ICD-10-CM

## 2022-01-31 PROCEDURE — 71250 CT THORAX DX C-: CPT

## 2022-03-16 ENCOUNTER — OFFICE VISIT (OUTPATIENT)
Dept: PODIATRY | Age: 87
End: 2022-03-16
Payer: MEDICARE

## 2022-03-16 VITALS
HEART RATE: 84 BPM | TEMPERATURE: 96.9 F | WEIGHT: 241 LBS | HEIGHT: 69 IN | SYSTOLIC BLOOD PRESSURE: 124 MMHG | BODY MASS INDEX: 35.7 KG/M2 | DIASTOLIC BLOOD PRESSURE: 63 MMHG

## 2022-03-16 DIAGNOSIS — L60.3 ONYCHODYSTROPHY: ICD-10-CM

## 2022-03-16 DIAGNOSIS — Z79.4 TYPE 2 DIABETES MELLITUS WITH CHRONIC KIDNEY DISEASE, WITH LONG-TERM CURRENT USE OF INSULIN, UNSPECIFIED CKD STAGE (HCC): Primary | ICD-10-CM

## 2022-03-16 DIAGNOSIS — E11.22 TYPE 2 DIABETES MELLITUS WITH CHRONIC KIDNEY DISEASE, WITH LONG-TERM CURRENT USE OF INSULIN, UNSPECIFIED CKD STAGE (HCC): Primary | ICD-10-CM

## 2022-03-16 PROCEDURE — G8427 DOCREV CUR MEDS BY ELIG CLIN: HCPCS | Performed by: PODIATRIST

## 2022-03-16 PROCEDURE — 1101F PT FALLS ASSESS-DOCD LE1/YR: CPT | Performed by: PODIATRIST

## 2022-03-16 PROCEDURE — G8536 NO DOC ELDER MAL SCRN: HCPCS | Performed by: PODIATRIST

## 2022-03-16 PROCEDURE — G8754 DIAS BP LESS 90: HCPCS | Performed by: PODIATRIST

## 2022-03-16 PROCEDURE — G8752 SYS BP LESS 140: HCPCS | Performed by: PODIATRIST

## 2022-03-16 PROCEDURE — 99213 OFFICE O/P EST LOW 20 MIN: CPT | Performed by: PODIATRIST

## 2022-03-16 PROCEDURE — G8432 DEP SCR NOT DOC, RNG: HCPCS | Performed by: PODIATRIST

## 2022-03-16 PROCEDURE — G8417 CALC BMI ABV UP PARAM F/U: HCPCS | Performed by: PODIATRIST

## 2022-03-16 NOTE — PROGRESS NOTES
Chief Complaint   Patient presents with    Diabetic Foot Exam     1. Have you been to the ER, urgent care clinic since your last visit? Hospitalized since your last visit? No    2. Have you seen or consulted any other health care providers outside of the [address] [place] [address] since your last visit? Include any pap smears or colon screening.  No  PCP-Mariposa Barney NP

## 2022-03-18 PROBLEM — R60.0 EDEMA OF LOWER EXTREMITY: Status: ACTIVE | Noted: 2020-08-05

## 2022-03-18 PROBLEM — N18.30 CHRONIC KIDNEY DISEASE, STAGE 3 (HCC): Status: ACTIVE | Noted: 2020-08-05

## 2022-03-18 PROBLEM — N40.0 BPH (BENIGN PROSTATIC HYPERPLASIA): Status: ACTIVE | Noted: 2020-08-04

## 2022-03-19 PROBLEM — E78.5 HYPERLIPIDEMIA: Status: ACTIVE | Noted: 2020-08-05

## 2022-03-19 PROBLEM — E11.9 TYPE 2 DIABETES MELLITUS WITHOUT COMPLICATION (HCC): Status: ACTIVE | Noted: 2020-08-05

## 2022-03-19 PROBLEM — I25.10 CORONARY ARTERIOSCLEROSIS: Status: ACTIVE | Noted: 2020-08-05

## 2022-03-19 PROBLEM — E87.6 HYPOKALEMIA: Status: ACTIVE | Noted: 2020-08-05

## 2022-03-19 PROBLEM — R41.3 MEMORY IMPAIRMENT: Status: ACTIVE | Noted: 2020-08-05

## 2022-03-19 PROBLEM — R35.0 INCREASED FREQUENCY OF URINATION: Status: ACTIVE | Noted: 2020-08-04

## 2022-03-19 PROBLEM — I10 ESSENTIAL HYPERTENSION: Status: ACTIVE | Noted: 2020-08-05

## 2022-03-19 PROBLEM — E11.22 TYPE 2 DIABETES MELLITUS WITH CHRONIC KIDNEY DISEASE (HCC): Status: ACTIVE | Noted: 2020-08-05

## 2022-03-20 PROBLEM — D50.9 IRON DEFICIENCY ANEMIA: Status: ACTIVE | Noted: 2020-08-05

## 2022-04-07 DIAGNOSIS — E11.22 TYPE 2 DIABETES MELLITUS WITH CHRONIC KIDNEY DISEASE, WITH LONG-TERM CURRENT USE OF INSULIN, UNSPECIFIED CKD STAGE (HCC): Primary | ICD-10-CM

## 2022-04-07 DIAGNOSIS — Z79.4 TYPE 2 DIABETES MELLITUS WITH CHRONIC KIDNEY DISEASE, WITH LONG-TERM CURRENT USE OF INSULIN, UNSPECIFIED CKD STAGE (HCC): Primary | ICD-10-CM

## 2022-04-11 RX ORDER — INSULIN LISPRO 100 [IU]/ML
10 INJECTION, SOLUTION INTRAVENOUS; SUBCUTANEOUS
Qty: 15 ADJUSTABLE DOSE PRE-FILLED PEN SYRINGE | Refills: 5 | Status: SHIPPED | OUTPATIENT
Start: 2022-04-11 | End: 2022-05-08

## 2022-04-12 NOTE — PROGRESS NOTES
[facility]    Subjective:         Patient is a 82 y.o. male who is being seen as a returning pt for diabetic pedal exam.  Patient states he has close follow-up with his PCP Vanesa Reyes NP). He states his last office visit with his PCP was 01/25/2022. He describes his diabetes as being under control, noting his last hemoglobin A1c was 5.7%. He denies any overt pedal pain. He denies any recent pedal trauma. He denies any systemic signs of infection but does state his nails are thick/discolored. He denies any other lower extremity complaints    Past Medical History:   Diagnosis Date    Chronic kidney disease     Diabetes ([facility]     Hypercholesterolemia     Hypertension      Past Surgical History:   Procedure Laterality Date    HX CHOLECYSTECTOMY  2010    HX COLONOSCOPY  2013    HX UROLOGICAL  09/24/2010    CYSTO       Family History   Problem Relation Age of Onset    Heart Disease Mother     Diabetes Mother     Other Father         anerysm     Diabetes Father       Social History     Tobacco Use    Smoking status: Former Smoker     Packs/day: 0.50     Years: 22.00     Pack years: 11.00     Types: Cigarettes    Smokeless tobacco: Never Used   Substance Use Topics    Alcohol use: Not Currently     No Known Allergies  Prior to Admission medications    Medication Sig Start Date End Date Taking? Authorizing Provider   insulin lispro (HumaLOG KwikPen Insulin) 100 unit/mL kwikpen 10 Units by SubCUTAneous route Before breakfast, lunch, and dinner. 0/76/63   Mariposa Oliveira NP   insulin glargine (Basaglar KwikPen U-100 Insulin) 100 unit/mL (3 mL) inpn 40 Units by SubCUTAneous route nightly. Indications: type 2 diabetes mellitus 2/39/39   Mika Guadarrama NP   furosemide (LASIX) 40 mg tablet Take 1 Tablet by mouth daily. Indications: visible water retention 0/94/50   Mariposa Oliveira NP   potassium chloride (K-DUR, KLOR-CON M20) 20 mEq tablet Take 1 Tablet by mouth daily.  12/6/21 Case Patel, DO   pravastatin (PRAVACHOL) 40 mg tablet Take 1 Tablet by mouth nightly. Indications: excessive fat in the blood 12/6/21   Case Jorge, DO   amLODIPine-valsartan [facility])  mg per tablet Take 1 Tablet by mouth daily. Indications: high blood pressure 21/27/43   Mariposa Barney NP   tamsulosin (Flomax) 0.4 mg capsule Take 1 Capsule by mouth daily. 6/5/29   Mariposa Gaines NP   ezetimibe (ZETIA) 10 mg tablet TAKE 1 TABLET BY MOUTH ONCE DAILY AT BEDTIME 2/60/02   Mariposa Gaines NP   naproxen (NAPROSYN) 500 mg tablet Take 1 tablet by mouth twice daily as needed 5/37/24   Jose Kennedy NP   aspirin delayed-release (Ecotrin Low Strength) 81 mg tablet Take  by mouth daily. Provider, Historical   ferrous sulfate 325 mg (65 mg iron) tablet Take  by mouth Daily (before breakfast). Provider, Historical   SITagliptin (Januvia) 50 mg tablet Take 50 mg by mouth daily. Provider, Historical   timolol (TIMOPTIC) 0.5 % ophthalmic solution 1 Drop two (2) times a day. Provider, Historical   cholecalciferol, vitamin D3, (VITAMIN D3 PO) Take  by mouth. Provider, Historical   glucose blood VI test strips (Accu-Chek Ghazal Plus test strp) strip by Does Not Apply route See Admin Instructions. Provider, Historical   lancets (Accu-Chek Softclix Lancets) misc by Does Not Apply route. Provider, Historical   alcohol swabs (Alcohol Prep Pads) padm by Apply Externally route. Provider, Historical   Insulin Needles, Disposable, (BD Ultra-Fine Mini Pen Needle) 31 gauge x 3/16\" ndle by Does Not Apply route. Provider, Historical   Insulin Syringe-Needle U-100 (Comfort EZ Insulin Syringe) 0.5 mL 30 gauge x 5/16\" syrg by Does Not Apply route. Provider, Historical       Review of Systems   Constitutional: Negative. HENT: Negative. Eyes: Negative. Respiratory: Negative. Cardiovascular: Positive for leg swelling. Gastrointestinal: Negative. Endocrine: Negative.     Genitourinary: Positive for frequency. Musculoskeletal: Positive for arthralgias. Skin: Negative. Allergic/Immunologic: Positive for immunocompromised state. Neurological: Positive for numbness. Hematological: Negative. Psychiatric/Behavioral: Negative. All other systems reviewed and are negative. Objective:     Visit Vitals  /63 (BP 1 Location: Right upper arm, BP Patient Position: Sitting, BP Cuff Size: Large adult)   Pulse 84   Temp 96.9 °F (36.1 °C) (Temporal)   Ht 5' 9\" (1.753 m)   Wt 241 lb (109.3 kg)   BMI 35.59 kg/m²       Physical Exam  Vitals reviewed. Constitutional:       Appearance: He is morbidly obese. Cardiovascular:      Pulses:           Dorsalis pedis pulses are 2+ on the right side and 2+ on the left side. Posterior tibial pulses are 2+ on the right side and 2+ on the left side. Pulmonary:      Effort: Pulmonary effort is normal.   Musculoskeletal:      Right lower leg: Edema present. Left lower leg: Edema present. Right foot: Normal range of motion. No deformity or bunion. Left foot: Normal range of motion. No deformity or bunion. Feet:      Right foot:      Protective Sensation: 10 sites tested. 10 sites sensed. Skin integrity: Skin integrity normal.      Toenail Condition: Right toenails are abnormally thick. Left foot:      Protective Sensation: 10 sites tested. 10 sites sensed. Skin integrity: Skin integrity normal.      Toenail Condition: Left toenails are abnormally thick. Lymphadenopathy:      Lower Body: No right inguinal adenopathy. No left inguinal adenopathy. Skin:     General: Skin is warm. Capillary Refill: Capillary refill takes 2 to 3 seconds. Comments: Absent pedal hair growth with hyperpigmentation noted to the skin   Neurological:      Mental Status: He is alert and oriented to person, place, and time.       Comments: Paresthesias noted to bilateral lower extremities   Psychiatric:         Mood and Affect: Mood and affect normal.         Behavior: Behavior is cooperative. Data Review: No results found for this or any previous visit (from the past 24 hour(s)). Impression:       ICD-10-CM ICD-9-CM    1. Type 2 diabetes mellitus with chronic kidney disease, with long-term current use of insulin, unspecified CKD stage ([facility]  E11.22 250.40     Z79.4 585. 9      V58.67    2. 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 Instructed patient to monitor their feet daily for possible wound formation, be compliant with all medications and wear supportive shoe gear for wound prevention. Reviewed and discussed most recent lab work, specifically as it pertains to his diabetes. Pt verbalized understanding of all discussed and reviewed        Jose G Akers, [facility], [address] [place] [address] and [facility]  [address] [place] [address]  O: [phone]  F: [phone]  C: [phone]

## 2022-04-26 ENCOUNTER — TELEPHONE (OUTPATIENT)
Dept: FAMILY MEDICINE CLINIC | Age: 87
End: 2022-04-26

## 2022-05-08 ENCOUNTER — HOSPITAL ENCOUNTER (INPATIENT)
Age: 87
LOS: 1 days | Discharge: HOME | DRG: 395 | End: 2022-05-09
Attending: EMERGENCY MEDICINE | Admitting: INTERNAL MEDICINE
Payer: MEDICARE

## 2022-05-08 ENCOUNTER — APPOINTMENT (OUTPATIENT)
Dept: ENDOSCOPY | Age: 87
DRG: 395 | End: 2022-05-08
Attending: INTERNAL MEDICINE
Payer: MEDICARE

## 2022-05-08 DIAGNOSIS — K62.5 BRIGHT RED BLOOD PER RECTUM: Primary | ICD-10-CM

## 2022-05-08 PROBLEM — K92.2 GI BLEED: Status: ACTIVE | Noted: 2022-05-08

## 2022-05-08 LAB
ABO + RH BLD: NORMAL
ALBUMIN SERPL-MCNC: 3.2 G/DL (ref 3.5–5)
ALBUMIN/GLOB SERPL: 1.1 {RATIO} (ref 1.1–2.2)
ALP SERPL-CCNC: 89 U/L (ref 45–117)
ALT SERPL-CCNC: 19 U/L (ref 12–78)
ANION GAP SERPL CALC-SCNC: 4 MMOL/L (ref 5–15)
AST SERPL W P-5'-P-CCNC: 16 U/L (ref 15–37)
BASOPHILS # BLD: 0 K/UL (ref 0–0.1)
BASOPHILS NFR BLD: 0 % (ref 0–1)
BILIRUB SERPL-MCNC: 0.5 MG/DL (ref 0.2–1)
BLOOD GROUP ANTIBODIES SERPL: NEGATIVE
BUN SERPL-MCNC: 26 MG/DL (ref 6–20)
BUN/CREAT SERPL: 14 (ref 12–20)
CA-I BLD-MCNC: 8.4 MG/DL (ref 8.5–10.1)
CHLORIDE SERPL-SCNC: 113 MMOL/L (ref 97–108)
CO2 SERPL-SCNC: 25 MMOL/L (ref 21–32)
CREAT SERPL-MCNC: 1.87 MG/DL (ref 0.7–1.3)
DIFFERENTIAL METHOD BLD: ABNORMAL
EOSINOPHIL # BLD: 0.2 K/UL (ref 0–0.4)
EOSINOPHIL NFR BLD: 2 % (ref 0–7)
ERYTHROCYTE [DISTWIDTH] IN BLOOD BY AUTOMATED COUNT: 12.5 % (ref 11.5–14.5)
GLOBULIN SER CALC-MCNC: 2.8 G/DL (ref 2–4)
GLUCOSE BLD STRIP.AUTO-MCNC: 124 MG/DL (ref 65–117)
GLUCOSE BLD STRIP.AUTO-MCNC: 157 MG/DL (ref 65–117)
GLUCOSE SERPL-MCNC: 184 MG/DL (ref 65–100)
HCT VFR BLD AUTO: 28.8 % (ref 36.6–50.3)
HCT VFR BLD AUTO: 29.1 % (ref 36.6–50.3)
HCT VFR BLD AUTO: 30.6 % (ref 36.6–50.3)
HGB BLD-MCNC: 10.5 G/DL (ref 12.1–17)
HGB BLD-MCNC: 9.8 G/DL (ref 12.1–17)
HGB BLD-MCNC: 9.9 G/DL (ref 12.1–17)
IMM GRANULOCYTES # BLD AUTO: 0 K/UL (ref 0–0.04)
IMM GRANULOCYTES NFR BLD AUTO: 0 % (ref 0–0.5)
INR PPP: 1 (ref 0.9–1.1)
LYMPHOCYTES # BLD: 0.9 K/UL (ref 0.8–3.5)
LYMPHOCYTES NFR BLD: 13 % (ref 12–49)
MCH RBC QN AUTO: 31.5 PG (ref 26–34)
MCHC RBC AUTO-ENTMCNC: 34.3 G/DL (ref 30–36.5)
MCV RBC AUTO: 91.9 FL (ref 80–99)
MONOCYTES # BLD: 0.4 K/UL (ref 0–1)
MONOCYTES NFR BLD: 6 % (ref 5–13)
NEUTS SEG # BLD: 5.5 K/UL (ref 1.8–8)
NEUTS SEG NFR BLD: 79 % (ref 32–75)
NRBC # BLD: 0 K/UL (ref 0–0.01)
NRBC BLD-RTO: 0 PER 100 WBC
PERFORMED BY, TECHID: ABNORMAL
PERFORMED BY, TECHID: ABNORMAL
PLATELET # BLD AUTO: 106 K/UL (ref 150–400)
PMV BLD AUTO: 8.8 FL (ref 8.9–12.9)
POTASSIUM SERPL-SCNC: 4.2 MMOL/L (ref 3.5–5.1)
PROT SERPL-MCNC: 6 G/DL (ref 6.4–8.2)
PROTHROMBIN TIME: 13.7 SEC (ref 11.9–14.6)
RBC # BLD AUTO: 3.33 M/UL (ref 4.1–5.7)
SODIUM SERPL-SCNC: 142 MMOL/L (ref 136–145)
SPECIMEN EXP DATE BLD: NORMAL
WBC # BLD AUTO: 6.9 K/UL (ref 4.1–11.1)

## 2022-05-08 PROCEDURE — 94761 N-INVAS EAR/PLS OXIMETRY MLT: CPT

## 2022-05-08 PROCEDURE — 74011636637 HC RX REV CODE- 636/637: Performed by: PHYSICIAN ASSISTANT

## 2022-05-08 PROCEDURE — 65270000029 HC RM PRIVATE

## 2022-05-08 PROCEDURE — 80053 COMPREHEN METABOLIC PANEL: CPT

## 2022-05-08 PROCEDURE — 99285 EMERGENCY DEPT VISIT HI MDM: CPT

## 2022-05-08 PROCEDURE — 74011000250 HC RX REV CODE- 250: Performed by: INTERNAL MEDICINE

## 2022-05-08 PROCEDURE — C9113 INJ PANTOPRAZOLE SODIUM, VIA: HCPCS | Performed by: PHYSICIAN ASSISTANT

## 2022-05-08 PROCEDURE — 85018 HEMOGLOBIN: CPT

## 2022-05-08 PROCEDURE — 74011250636 HC RX REV CODE- 250/636: Performed by: PHYSICIAN ASSISTANT

## 2022-05-08 PROCEDURE — 74011250637 HC RX REV CODE- 250/637: Performed by: PHYSICIAN ASSISTANT

## 2022-05-08 PROCEDURE — 86900 BLOOD TYPING SEROLOGIC ABO: CPT

## 2022-05-08 PROCEDURE — 85025 COMPLETE CBC W/AUTO DIFF WBC: CPT

## 2022-05-08 PROCEDURE — 82962 GLUCOSE BLOOD TEST: CPT

## 2022-05-08 PROCEDURE — 36415 COLL VENOUS BLD VENIPUNCTURE: CPT

## 2022-05-08 PROCEDURE — 85610 PROTHROMBIN TIME: CPT

## 2022-05-08 PROCEDURE — 74011000250 HC RX REV CODE- 250: Performed by: PHYSICIAN ASSISTANT

## 2022-05-08 RX ORDER — CHOLECALCIFEROL TAB 125 MCG (5000 UNIT) 125 MCG
5000 TAB ORAL DAILY
Status: DISCONTINUED | OUTPATIENT
Start: 2022-05-08 | End: 2022-05-09 | Stop reason: HOSPADM

## 2022-05-08 RX ORDER — ONDANSETRON 2 MG/ML
4 INJECTION INTRAMUSCULAR; INTRAVENOUS
Status: DISCONTINUED | OUTPATIENT
Start: 2022-05-08 | End: 2022-05-09 | Stop reason: HOSPADM

## 2022-05-08 RX ORDER — SODIUM CHLORIDE 0.9 % (FLUSH) 0.9 %
5-40 SYRINGE (ML) INJECTION EVERY 8 HOURS
Status: DISCONTINUED | OUTPATIENT
Start: 2022-05-08 | End: 2022-05-09 | Stop reason: HOSPADM

## 2022-05-08 RX ORDER — INSULIN LISPRO 100 [IU]/ML
INJECTION, SOLUTION INTRAVENOUS; SUBCUTANEOUS
Status: DISCONTINUED | OUTPATIENT
Start: 2022-05-08 | End: 2022-05-09 | Stop reason: HOSPADM

## 2022-05-08 RX ORDER — INSULIN GLARGINE 100 [IU]/ML
26 INJECTION, SOLUTION SUBCUTANEOUS
Status: DISCONTINUED | OUTPATIENT
Start: 2022-05-08 | End: 2022-05-09 | Stop reason: HOSPADM

## 2022-05-08 RX ORDER — MAGNESIUM SULFATE 100 %
4 CRYSTALS MISCELLANEOUS AS NEEDED
Status: DISCONTINUED | OUTPATIENT
Start: 2022-05-08 | End: 2022-05-09 | Stop reason: HOSPADM

## 2022-05-08 RX ORDER — ALOGLIPTIN 12.5 MG/1
12.5 TABLET, FILM COATED ORAL DAILY
Status: DISCONTINUED | OUTPATIENT
Start: 2022-05-08 | End: 2022-05-09 | Stop reason: HOSPADM

## 2022-05-08 RX ORDER — TAMSULOSIN HYDROCHLORIDE 0.4 MG/1
0.4 CAPSULE ORAL DAILY
Status: DISCONTINUED | OUTPATIENT
Start: 2022-05-08 | End: 2022-05-09 | Stop reason: HOSPADM

## 2022-05-08 RX ORDER — TIMOLOL MALEATE 5 MG/ML
1 SOLUTION/ DROPS OPHTHALMIC 2 TIMES DAILY
Status: DISCONTINUED | OUTPATIENT
Start: 2022-05-08 | End: 2022-05-09 | Stop reason: HOSPADM

## 2022-05-08 RX ORDER — ATORVASTATIN CALCIUM 10 MG/1
10 TABLET, FILM COATED ORAL DAILY
Status: DISCONTINUED | OUTPATIENT
Start: 2022-05-09 | End: 2022-05-09 | Stop reason: HOSPADM

## 2022-05-08 RX ORDER — ACETAMINOPHEN 325 MG/1
650 TABLET ORAL
Status: DISCONTINUED | OUTPATIENT
Start: 2022-05-08 | End: 2022-05-09 | Stop reason: HOSPADM

## 2022-05-08 RX ORDER — DEXTROSE MONOHYDRATE 100 MG/ML
0-250 INJECTION, SOLUTION INTRAVENOUS AS NEEDED
Status: DISCONTINUED | OUTPATIENT
Start: 2022-05-08 | End: 2022-05-09 | Stop reason: HOSPADM

## 2022-05-08 RX ORDER — PRAVASTATIN SODIUM 40 MG/1
40 TABLET ORAL
Status: DISCONTINUED | OUTPATIENT
Start: 2022-05-08 | End: 2022-05-08

## 2022-05-08 RX ORDER — INSULIN GLARGINE 100 [IU]/ML
14 INJECTION, SOLUTION SUBCUTANEOUS DAILY
Status: DISCONTINUED | OUTPATIENT
Start: 2022-05-08 | End: 2022-05-09 | Stop reason: HOSPADM

## 2022-05-08 RX ORDER — ACETAMINOPHEN 650 MG/1
650 SUPPOSITORY RECTAL
Status: DISCONTINUED | OUTPATIENT
Start: 2022-05-08 | End: 2022-05-09 | Stop reason: HOSPADM

## 2022-05-08 RX ORDER — ONDANSETRON 4 MG/1
4 TABLET, ORALLY DISINTEGRATING ORAL
Status: DISCONTINUED | OUTPATIENT
Start: 2022-05-08 | End: 2022-05-09 | Stop reason: HOSPADM

## 2022-05-08 RX ORDER — FUROSEMIDE 40 MG/1
40 TABLET ORAL DAILY
Status: DISCONTINUED | OUTPATIENT
Start: 2022-05-08 | End: 2022-05-09 | Stop reason: HOSPADM

## 2022-05-08 RX ORDER — AMLODIPINE BESYLATE 5 MG/1
10 TABLET ORAL DAILY
Status: DISCONTINUED | OUTPATIENT
Start: 2022-05-08 | End: 2022-05-09 | Stop reason: HOSPADM

## 2022-05-08 RX ORDER — AMLODIPINE AND VALSARTAN 10; 320 MG/1; MG/1
1 TABLET ORAL DAILY
Status: DISCONTINUED | OUTPATIENT
Start: 2022-05-08 | End: 2022-05-08

## 2022-05-08 RX ORDER — SODIUM CHLORIDE 0.9 % (FLUSH) 0.9 %
5-40 SYRINGE (ML) INJECTION AS NEEDED
Status: DISCONTINUED | OUTPATIENT
Start: 2022-05-08 | End: 2022-05-09 | Stop reason: HOSPADM

## 2022-05-08 RX ORDER — POLYETHYLENE GLYCOL 3350 17 G/17G
17 POWDER, FOR SOLUTION ORAL DAILY PRN
Status: DISCONTINUED | OUTPATIENT
Start: 2022-05-08 | End: 2022-05-09 | Stop reason: HOSPADM

## 2022-05-08 RX ORDER — VALSARTAN 80 MG/1
320 TABLET ORAL DAILY
Status: DISCONTINUED | OUTPATIENT
Start: 2022-05-08 | End: 2022-05-09 | Stop reason: HOSPADM

## 2022-05-08 RX ADMIN — AMLODIPINE BESYLATE 10 MG: 5 TABLET ORAL at 17:31

## 2022-05-08 RX ADMIN — CHOLECALCIFEROL TAB 125 MCG (5000 UNIT) 5000 UNITS: 125 TAB at 13:11

## 2022-05-08 RX ADMIN — TIMOLOL MALEATE 1 DROP: 5 SOLUTION/ DROPS OPHTHALMIC at 23:55

## 2022-05-08 RX ADMIN — INSULIN GLARGINE 26 UNITS: 100 INJECTION, SOLUTION SUBCUTANEOUS at 23:00

## 2022-05-08 RX ADMIN — SODIUM CHLORIDE, PRESERVATIVE FREE 40 MG: 5 INJECTION INTRAVENOUS at 10:40

## 2022-05-08 RX ADMIN — SODIUM CHLORIDE, PRESERVATIVE FREE 10 ML: 5 INJECTION INTRAVENOUS at 22:49

## 2022-05-08 RX ADMIN — VALSARTAN 320 MG: 80 TABLET, FILM COATED ORAL at 17:31

## 2022-05-08 RX ADMIN — FUROSEMIDE 40 MG: 40 TABLET ORAL at 13:11

## 2022-05-08 RX ADMIN — POLYETHYLENE GLYCOL 3350, SODIUM SULFATE ANHYDROUS, SODIUM BICARBONATE, SODIUM CHLORIDE, POTASSIUM CHLORIDE 4000 ML: 236; 22.74; 6.74; 5.86; 2.97 POWDER, FOR SOLUTION ORAL at 23:54

## 2022-05-08 RX ADMIN — INSULIN LISPRO 2 UNITS: 100 INJECTION, SOLUTION INTRAVENOUS; SUBCUTANEOUS at 13:11

## 2022-05-08 RX ADMIN — SODIUM CHLORIDE, PRESERVATIVE FREE 40 MG: 5 INJECTION INTRAVENOUS at 22:48

## 2022-05-08 NOTE — H&P
History and Physical    Patient: Hillary Kumari MRN: [id number]  SSN: [id number]    YOB: 1935  Age: 82 y.o. Sex: male      Subjective:      Hillary Kumari is a 82 y.o. male with a past medical history of insulin-dependent type 2 diabetes, hypercholesterolemia, hypertension, CKD that presented to the emergency room on 5/8/2022 after noticing bright red blood per rectum after using the bathroom around 2 AM.  He denied any nausea, vomiting, abdominal pain, dizziness, lightheadedness, syncopal episode, chest pain, shortness of breath. He has not noticed any further rectal bleeding since presenting to the ED. There is no alleviating or aggravating factors. In the ED vital signs stable. Laboratory data was significant for WBC of 10.5, glucose 184, BUN 26, serum creatinine 1.87. Recommended admission for GI evaluation with colonoscopy tomorrow. Past Medical History:   Diagnosis Date    Chronic kidney disease     Diabetes ([facility] [address]     Hypercholesterolemia     Hypertension      Past Surgical History:   Procedure Laterality Date    HX CHOLECYSTECTOMY  2010    HX COLONOSCOPY  2013    HX UROLOGICAL  09/24/2010    CYSTO      Family History   Problem Relation Age of Onset    Heart Disease Mother     Diabetes Mother     Other Father         anerysm     Diabetes Father      Social History     Tobacco Use    Smoking status: Former Smoker     Packs/day: 0.50     Years: 22.00     Pack years: 11.00     Types: Cigarettes    Smokeless tobacco: Never Used   Substance Use Topics    Alcohol use: Not Currently      Prior to Admission medications    Medication Sig Start Date End Date Taking? Authorizing Provider   insulin detemir U-100 (Levemir U-100 Insulin) 100 unit/mL injection 14 Units by SubCUTAneous route daily. In AM   Yes Provider, Historical   insulin detemir U-100 (Levemir U-100 Insulin) 100 unit/mL injection 26 Units by SubCUTAneous route nightly.    Yes Provider, Historical   furosemide (LASIX) 40 mg tablet Take 1 Tablet by mouth daily. Indications: visible water retention 1/25/22  Yes Mariposa Barney NP   pravastatin (PRAVACHOL) 40 mg tablet Take 1 Tablet by mouth nightly. Indications: excessive fat in the blood 12/6/21  Yes Shiva Spaulding,    amLODIPine-valsartan (EXFORGE)  mg per tablet Take 1 Tablet by mouth daily. Indications: high blood pressure 10/26/21  Yes Mariposa Barney NP   tamsulosin (Flomax) 0.4 mg capsule Take 1 Capsule by mouth daily. 9/3/21  Yes Mariposa Barney NP   aspirin delayed-release (Ecotrin Low Strength) 81 mg tablet Take  by mouth daily. Yes Provider, Historical   SITagliptin (Januvia) 50 mg tablet Take 100 mg by mouth daily. Yes Provider, Historical   timolol (TIMOPTIC) 0.5 % ophthalmic solution 1 Drop two (2) times a day. Yes Provider, Historical   cholecalciferol, vitamin D3, (VITAMIN D3 PO) Take  by mouth.    Yes Provider, Historical   naproxen (NAPROSYN) 500 mg tablet Take 1 tablet by mouth twice daily as needed 9/62/23   Mendel Mass, NP        No Known Allergies    Review of Systems:  Constitutional: No fevers, No chills, No fatigue, No weakness  Eyes: No visual disturbance  Ears, Nose, Mouth, Throat, and Face: No nasal congestion, No sore throat  Respiratory: No cough, No sputum, No wheezing, No SOB  Cardiovascular: No chest pain, No lower extremity edema, No Palpitations   Gastrointestinal: No nausea, No vomiting, No diarrhea, No constipation, No abdominal pain  Genitourinary: No frequency, No dysuria, No hematuria  Integument/Breast: No rash, No skin lesion(s), No dryness  Musculoskeletal: No arthralgias, No neck pain, No back pain  Neurological: No headaches, No dizziness, No confusion,  No seizures  Behavioral/Psychiatric: No anxiety, No depression      Objective:     Vitals:    05/08/22 0709 05/08/22 0903   BP: (!) 178/75 (!) 152/69   Pulse: 83 72   Resp: 18 21   Temp: 98.4 °F (36.9 °C) 98.2 °F (36.8 °C)   SpO2: 100% 97%   Weight: 108.9 kg (240 lb) Height: 5' 9\" (1.753 m)         Physical Exam:  General: alert, cooperative, no distress  Eye: conjunctivae/corneas clear. PERRL, EOM's intact. Throat and Neck: normal and no erythema or exudates noted. No mass   Lung: clear to auscultation bilaterally  Heart: regular rate and rhythm,   Abdomen: soft, non-tender. Bowel sounds normal. No masses,  Extremities:  able to move all extremities normal, atraumatic  Skin: Normal.  Neurologic: AOx3. Cranial nerves 2-12 and sensation grossly intact. Psychiatric: non focal    Recent Results (from the past 24 hour(s))   CBC WITH AUTOMATED DIFF    Collection Time: 05/08/22  7:26 AM   Result Value Ref Range    WBC 6.9 4.1 - 11.1 K/uL    RBC 3.33 (L) 4.10 - 5.70 M/uL    HGB 10.5 (L) 12.1 - 17.0 g/dL    HCT 30.6 (L) 36.6 - 50.3 %    MCV 91.9 80.0 - 99.0 FL    MCH 31.5 26.0 - 34.0 PG    MCHC 34.3 30.0 - 36.5 g/dL    RDW 12.5 11.5 - 14.5 %    PLATELET 501 (L) 424 - 400 K/uL    MPV 8.8 (L) 8.9 - 12.9 FL    NRBC 0.0 0.0  WBC    ABSOLUTE NRBC 0.00 0.00 - 0.01 K/uL    NEUTROPHILS 79 (H) 32 - 75 %    LYMPHOCYTES 13 12 - 49 %    MONOCYTES 6 5 - 13 %    EOSINOPHILS 2 0 - 7 %    BASOPHILS 0 0 - 1 %    IMMATURE GRANULOCYTES 0 0 - 0.5 %    ABS. NEUTROPHILS 5.5 1.8 - 8.0 K/UL    ABS. LYMPHOCYTES 0.9 0.8 - 3.5 K/UL    ABS. MONOCYTES 0.4 0.0 - 1.0 K/UL    ABS. EOSINOPHILS 0.2 0.0 - 0.4 K/UL    ABS. BASOPHILS 0.0 0.0 - 0.1 K/UL    ABS. IMM.  GRANS. 0.0 0.00 - 0.04 K/UL    DF AUTOMATED     TYPE & SCREEN    Collection Time: 05/08/22  7:26 AM   Result Value Ref Range    Crossmatch Expiration 05/11/2022,2359     ABO/Rh(D) B Positive     Antibody screen Negative    PROTHROMBIN TIME + INR    Collection Time: 05/08/22  7:26 AM   Result Value Ref Range    Prothrombin time 13.7 11.9 - 14.6 sec    INR 1.0 0.9 - 1.1     METABOLIC PANEL, COMPREHENSIVE    Collection Time: 05/08/22  7:26 AM   Result Value Ref Range    Sodium 142 136 - 145 mmol/L    Potassium 4.2 3.5 - 5.1 mmol/L    Chloride 113 (H) 97 - 108 mmol/L    CO2 25 21 - 32 mmol/L    Anion gap 4 (L) 5 - 15 mmol/L    Glucose 184 (H) 65 - 100 mg/dL    BUN 26 (H) 6 - 20 mg/dL    Creatinine 1.87 (H) 0.70 - 1.30 mg/dL    BUN/Creatinine ratio 14 12 - 20      GFR est AA 42 (L) >60 ml/min/1.73m2    GFR est non-AA 35 (L) >60 ml/min/1.73m2    Calcium 8.4 (L) 8.5 - 10.1 mg/dL    Bilirubin, total 0.5 0.2 - 1.0 mg/dL    AST (SGOT) 16 15 - 37 U/L    ALT (SGPT) 19 12 - 78 U/L    Alk. phosphatase 89 45 - 117 U/L    Protein, total 6.0 (L) 6.4 - 8.2 g/dL    Albumin 3.2 (L) 3.5 - 5.0 g/dL    Globulin 2.8 2.0 - 4.0 g/dL    A-G Ratio 1.1 1.1 - 2.2         XR Results (maximum last 3): Results from [place] encounter on 12/08/21    XR CHEST PA LAT    Narrative  Chest, 2 views. Comparison: None. Findings: The patient is status post median sternotomy. The cardiomediastinal  silhouette is within normal limits. There is mild calcified atherosclerotic  disease within the thoracic aorta. The anders and pulmonary vasculature are  unremarkable. The lungs are well expanded without focal consolidation, pleural  effusion or pneumothorax. The osseous structures are unremarkable. Cholecystectomy clips are noted. Impression  No acute cardiopulmonary process. CT Results (maximum last 3): Results from [place] encounter on 01/31/22    CT CHEST WO CONT    Narrative  CT chest without IV contrast.    Comparison CTA chest September 7, 2015. Axial images are reviewed along with reformatted sagittal/coronal/MIP images. No  IV contrast administered. Dose reduction: All CT scans at this facility are performed using dose reduction  optimization techniques as appropriate to a performed exam including the  following-  automated exposure control, adjustments of mA and/or Kv according to patient  size, or use of iterative reconstructive technique. Minor pleural parenchymal changes. New 3 mm RLL nodule. No pleural effusion.     Nonenhanced images reveal atherosclerotic change thoracic aorta. Tram track  calcifications along the course of coronary arteries evidence for coronary  artery disease. No pericardial effusion. Atherosclerosis continues into the imaged upper abdominal aorta. Nonspecific 3  cm right adrenal nodule also present on CTA chest September 7, 2015. Impression  3 mm RLL nodule could be followed for stability. Thoracoabdominal aorta atherosclerosis. CAD. No pleural or pericardial effusion. Stable right adrenal nodule. MRI Results (maximum last 3): No results found for this or any previous visit. Nuclear Medicine Results (maximum last 3): No results found for this or any previous visit. US Results (maximum last 3): No results found for this or any previous visit. Assessment:   1. Acute GI bleed  2. Insulin-dependent type 2 diabetes  3. Hypertension  4. Hypercholesterolemia  5. BPH without obstructive symptoms  6. CKD stage II    Plan:   1. Hemoglobin is stable. Hemodynamically stable. H&H every 6 hours for 1 day. On IV Protonix 40 mg twice daily. GI evaluation. Full liquid diet. Possibly colonoscopy tomorrow in the a.m.  2.  Glucose level stable. Continue with Levemir 14 units in a.m. and 26 units in the p.m. Patient takes Januvia 100 mg daily which we will substitute. On insulin sliding scale. Hold if glucose level in the evening is less than 180  3. Patient's blood pressure is stable. Continue to monitor per unit protocol. On amlodipine and losartan. Hold if blood pressures less than 120 or heart rate less than 60  4. Continue pravastatin  5. Flomax  6. Renal function is stable. Continue to monitor closely  7. CBC BMP in a.m.     Full code    DVT prophylaxis SCDs  GI prophylaxis IV Protonix twice daily    Total time: 61 min   Signed By: James Machuca PA-C     May 8, 2022

## 2022-05-08 NOTE — PROGRESS NOTES
Received patient from ED. Tele monitor in place, no complaints at this time. Will continue to monitor.

## 2022-05-08 NOTE — CONSULTS
Consult    Patient: Dolores Verdugo MRN: [id number]  SSN: [id number]    YOB: 1935  Age: 82 y.o. Sex: male      Subjective:      Dolores Verdugo is a 82 y.o. male who is being seen for rectal bleeding  Patient presented to the emergency room for bright red blood per rectum   Multiple episode, no severe abdominal pain, no lightheadedness, he was brought emergency for evaluation, emergency room hemoglobin around 10, no CT has been performed, no nausea vomiting, hemodynamic stable,     He has not noticed any further rectal bleeding since presenting to the ED. patient will be admitted hospital for further evaluation such as colonoscopy tomorrow. He denied any nausea, vomiting, abdominal pain,syncopal episode, chest pain, shortness of breath.   Not sure when last time he had a colonoscopy,   he is not on any Coumadin, or any antiplatelet drugs,  Past Medical History:   Diagnosis Date    Chronic kidney disease     Diabetes ([address]     Hypercholesterolemia     Hypertension      Past Surgical History:   Procedure Laterality Date    HX CHOLECYSTECTOMY  2010    HX COLONOSCOPY  2013    HX UROLOGICAL  09/24/2010    CYSTO      Family History   Problem Relation Age of Onset    Heart Disease Mother     Diabetes Mother     Other Father         anerysm     Diabetes Father      Social History     Tobacco Use    Smoking status: Former Smoker     Packs/day: 0.50     Years: 22.00     Pack years: 11.00     Types: Cigarettes    Smokeless tobacco: Never Used   Substance Use Topics    Alcohol use: Not Currently      Current Facility-Administered Medications   Medication Dose Route Frequency Provider Last Rate Last Admin    sodium chloride (NS) flush 5-40 mL  5-40 mL IntraVENous Q8H Clari Ocampo PA-C        sodium chloride (NS) flush 5-40 mL  5-40 mL IntraVENous PRN Clari Ocampo PA-C        acetaminophen (TYLENOL) tablet 650 mg  650 mg Oral Q6H PRN Clari Ocampo PA-C        Or    acetaminophen (TYLENOL) suppository 650 mg  650 mg Rectal Q6H PRN Clari Ocampo PA-C        polyethylene glycol (MIRALAX) packet 17 g  17 g Oral DAILY PRN Clari Ocampo PA-C        ondansetron (ZOFRAN ODT) tablet 4 mg  4 mg Oral Q8H PRN Clari Ocampo PA-C        Or    ondansetron (ZOFRAN) injection 4 mg  4 mg IntraVENous Q6H PRN Clari Ocampo PA-C        glucose chewable tablet 16 g  4 Tablet Oral PRN Clari Ocampo PA-C        dextrose 10% infusion 0-250 mL  0-250 mL IntraVENous PRN Clari Ocampo PA-C        glucagon (GLUCAGEN) injection 1 mg  1 mg IntraMUSCular PRN Clari Ocampo PA-C        insulin lispro (HUMALOG) injection   SubCUTAneous AC&HS Lorrettee Dunn PA-C   2 Units at 05/08/22 1311    amLODIPine-valsartan (EXFORGE)  mg per tablet tab 1 Tablet  1 Tablet Oral DAILY Clari Ocampo PA-C        cholecalciferol (VITAMIN D3) (5000 Units/125 mcg) tablet 5,000 Units  5,000 Units Oral DAILY Clari Ocampo PA-C   5,000 Units at 05/08/22 1311    furosemide (LASIX) tablet 40 mg  40 mg Oral DAILY Clari Ocampo PA-C   40 mg at 05/08/22 1311    insulin glargine (LANTUS) injection 14 Units  14 Units SubCUTAneous DAILY Clari Ocampo PA-C        insulin glargine (LANTUS) injection 26 Units  26 Units SubCUTAneous QHS Clari Ocampo PA-C        pravastatin (PRAVACHOL) tablet 40 mg  40 mg Oral QHS Clari Ocampo PA-C        alogliptin (NESINA) tablet 25 mg  25 mg Oral DAILY Clari Ocampo PA-C        tamsulosin (FLOMAX) capsule 0.4 mg  0.4 mg Oral DAILY Clari Ocampo PA-C        timolol (TIMOPTIC) 0.5 % ophthalmic solution 1 Drop  1 Drop Both Eyes BID Clari Ocampo PA-C        pantoprazole (PROTONIX) 40 mg in 0.9% sodium chloride 10 mL injection  40 mg IntraVENous Q12H Clari Ocampo PA-C   40 mg at 05/08/22 1040     Current Outpatient Medications   Medication Sig Dispense Refill    insulin detemir U-100 (Levemir U-100 Insulin) 100 unit/mL injection 14 Units by SubCUTAneous route daily. In AM      insulin detemir U-100 (Levemir U-100 Insulin) 100 unit/mL injection 26 Units by SubCUTAneous route nightly.  furosemide (LASIX) 40 mg tablet Take 1 Tablet by mouth daily. Indications: visible water retention 90 Tablet 1    pravastatin (PRAVACHOL) 40 mg tablet Take 1 Tablet by mouth nightly. Indications: excessive fat in the blood 90 Tablet 3    amLODIPine-valsartan (EXFORGE)  mg per tablet Take 1 Tablet by mouth daily. Indications: high blood pressure 90 Tablet 3    tamsulosin (Flomax) 0.4 mg capsule Take 1 Capsule by mouth daily. 90 Capsule 3    aspirin delayed-release (Ecotrin Low Strength) 81 mg tablet Take  by mouth daily.  SITagliptin (Januvia) 50 mg tablet Take 100 mg by mouth daily.  timolol (TIMOPTIC) 0.5 % ophthalmic solution 1 Drop two (2) times a day.  cholecalciferol, vitamin D3, (VITAMIN D3 PO) Take  by mouth.  naproxen (NAPROSYN) 500 mg tablet Take 1 tablet by mouth twice daily as needed 60 Tab 5        No Known Allergies    Review of Systems:  Review of Systems   Constitutional: Positive for malaise/fatigue. HENT: Negative. Eyes: Negative. Respiratory: Negative. Cardiovascular: Negative. Gastrointestinal: Positive for blood in stool. Genitourinary: Negative for frequency and urgency. Musculoskeletal: Negative. Skin: Negative. Neurological: Positive for weakness. Psychiatric/Behavioral: Negative. Objective:     Vitals:    05/08/22 0709 05/08/22 0903   BP: (!) 178/75 (!) 152/69   Pulse: 83 72   Resp: 18 21   Temp: 98.4 °F (36.9 °C) 98.2 °F (36.8 °C)   SpO2: 100% 97%   Weight: 108.9 kg (240 lb)    Height: 5' 9\" (1.753 m)         Physical Exam:  Physical Exam  Constitutional:       Appearance: Normal appearance. HENT:      Head: Atraumatic. Mouth/Throat:      Mouth: Mucous membranes are dry. Eyes:      General: No scleral icterus. Cardiovascular:      Rate and Rhythm: Normal rate. Pulses: Normal pulses. Pulmonary:      Breath sounds: Normal breath sounds. Abdominal:      General: Abdomen is flat. Bowel sounds are normal.      Palpations: Abdomen is soft. Musculoskeletal:         General: Normal range of motion. Cervical back: Neck supple. Skin:     General: Skin is warm. Neurological:      Mental Status: Mental status is at baseline. Psychiatric:         Mood and Affect: Mood normal.          Recent Results (from the past 24 hour(s))   CBC WITH AUTOMATED DIFF    Collection Time: 05/08/22  7:26 AM   Result Value Ref Range    WBC 6.9 4.1 - 11.1 K/uL    RBC 3.33 (L) 4.10 - 5.70 M/uL    HGB 10.5 (L) 12.1 - 17.0 g/dL    HCT 30.6 (L) 36.6 - 50.3 %    MCV 91.9 80.0 - 99.0 FL    MCH 31.5 26.0 - 34.0 PG    MCHC 34.3 30.0 - 36.5 g/dL    RDW 12.5 11.5 - 14.5 %    PLATELET 772 (L) 325 - 400 K/uL    MPV 8.8 (L) 8.9 - 12.9 FL    NRBC 0.0 0.0  WBC    ABSOLUTE NRBC 0.00 0.00 - 0.01 K/uL    NEUTROPHILS 79 (H) 32 - 75 %    LYMPHOCYTES 13 12 - 49 %    MONOCYTES 6 5 - 13 %    EOSINOPHILS 2 0 - 7 %    BASOPHILS 0 0 - 1 %    IMMATURE GRANULOCYTES 0 0 - 0.5 %    ABS. NEUTROPHILS 5.5 1.8 - 8.0 K/UL    ABS. LYMPHOCYTES 0.9 0.8 - 3.5 K/UL    ABS. MONOCYTES 0.4 0.0 - 1.0 K/UL    ABS. EOSINOPHILS 0.2 0.0 - 0.4 K/UL    ABS. BASOPHILS 0.0 0.0 - 0.1 K/UL    ABS. IMM.  GRANS. 0.0 0.00 - 0.04 K/UL    DF AUTOMATED     TYPE & SCREEN    Collection Time: 05/08/22  7:26 AM   Result Value Ref Range    Crossmatch Expiration 05/11/2022,2359     ABO/Rh(D) B Positive     Antibody screen Negative    PROTHROMBIN TIME + INR    Collection Time: 05/08/22  7:26 AM   Result Value Ref Range    Prothrombin time 13.7 11.9 - 14.6 sec    INR 1.0 0.9 - 1.1     METABOLIC PANEL, COMPREHENSIVE    Collection Time: 05/08/22  7:26 AM   Result Value Ref Range    Sodium 142 136 - 145 mmol/L    Potassium 4.2 3.5 - 5.1 mmol/L    Chloride 113 (H) 97 - 108 mmol/L    CO2 25 21 - 32 mmol/L    Anion gap 4 (L) 5 - 15 mmol/L    Glucose 184 (H) 65 - 100 mg/dL    BUN 26 (H) 6 - 20 mg/dL    Creatinine 1.87 (H) 0.70 - 1.30 mg/dL    BUN/Creatinine ratio 14 12 - 20      GFR est AA 42 (L) >60 ml/min/1.73m2    GFR est non-AA 35 (L) >60 ml/min/1.73m2    Calcium 8.4 (L) 8.5 - 10.1 mg/dL    Bilirubin, total 0.5 0.2 - 1.0 mg/dL    AST (SGOT) 16 15 - 37 U/L    ALT (SGPT) 19 12 - 78 U/L    Alk.  phosphatase 89 45 - 117 U/L    Protein, total 6.0 (L) 6.4 - 8.2 g/dL    Albumin 3.2 (L) 3.5 - 5.0 g/dL    Globulin 2.8 2.0 - 4.0 g/dL    A-G Ratio 1.1 1.1 - 2.2     HGB & HCT    Collection Time: 05/08/22 10:13 AM   Result Value Ref Range    HGB 9.8 (L) 12.1 - 17.0 g/dL    HCT 28.8 (L) 36.6 - 50.3 %        No orders to display      Assessment:     Hospital Problems  Date Reviewed: 4/12/2022          Codes Class Noted POA    GI bleed ICD-10-CM: K92.2  ICD-9-CM: 578.9  5/8/2022 Unknown          Red blood per rectum,  Hemoglobin around 10, unknown baseline    Renal sufficiency,  Likely due to prerenal secondary to GI bleeding  Plan:   Continue monitoring patient's hemoglobin, general condition vital signs in the hospital overnight, high risk for fall  Continue home medications  Clear liquids  Bowel preparation with GoLytely    N.p.o. midnight  Colonoscopy in the morning for evaluation    Indication, risks, discussed with patient  Signed By: Cara Gonzalez MD     May 8, 2022         Thank you for allowing me to participate in this patients care  Cc Referring Physician   Nerissa Sahu NP

## 2022-05-08 NOTE — ED PROVIDER NOTES
EMERGENCY DEPARTMENT HISTORY AND PHYSICAL EXAM      Date: 5/8/2022  Patient Name: Blaise Hanson      History of Presenting Illness     Chief Complaint   Patient presents with    Melena       History Provided By: Patient    HPI: Blaise Hanson, 82 y.o. male with a past medical history significant for CKD, IDDM2, BPH, CAD s/p CABG presents to the ED with cc of rectal bleeding. Onset 2AM when he went to the bathroom. Last time he had this was after polypectomy 2-3 years ago. No colonoscopy since. Denies F/C/N/V/D, cough, abd pain, diarrhea. Denies lightheadedness, palpitations, states SOB is at baseline. No heavy EtOH use. No melena. There are no other complaints, changes, or physical findings at this time. PCP: Melissa Quinn NP    Current Outpatient Medications   Medication Sig Dispense Refill    insulin lispro (HumaLOG KwikPen Insulin) 100 unit/mL kwikpen 10 Units by SubCUTAneous route Before breakfast, lunch, and dinner. 15 Adjustable Dose Pre-filled Pen Syringe 5    insulin glargine (Basaglar KwikPen U-100 Insulin) 100 unit/mL (3 mL) inpn 40 Units by SubCUTAneous route nightly. Indications: type 2 diabetes mellitus 6 mL 0    furosemide (LASIX) 40 mg tablet Take 1 Tablet by mouth daily. Indications: visible water retention 90 Tablet 1    potassium chloride (K-DUR, KLOR-CON M20) 20 mEq tablet Take 1 Tablet by mouth daily. 90 Tablet 1    pravastatin (PRAVACHOL) 40 mg tablet Take 1 Tablet by mouth nightly. Indications: excessive fat in the blood 90 Tablet 3    amLODIPine-valsartan (EXFORGE)  mg per tablet Take 1 Tablet by mouth daily. Indications: high blood pressure 90 Tablet 3    tamsulosin (Flomax) 0.4 mg capsule Take 1 Capsule by mouth daily.  90 Capsule 3    ezetimibe (ZETIA) 10 mg tablet TAKE 1 TABLET BY MOUTH ONCE DAILY AT BEDTIME 90 Tablet 3    naproxen (NAPROSYN) 500 mg tablet Take 1 tablet by mouth twice daily as needed 60 Tab 5    aspirin delayed-release (Ecotrin Low Strength) 81 mg tablet Take  by mouth daily.  ferrous sulfate 325 mg (65 mg iron) tablet Take  by mouth Daily (before breakfast).  SITagliptin (Januvia) 50 mg tablet Take 50 mg by mouth daily.  timolol (TIMOPTIC) 0.5 % ophthalmic solution 1 Drop two (2) times a day.  cholecalciferol, vitamin D3, (VITAMIN D3 PO) Take  by mouth.  glucose blood VI test strips (Accu-Chek Ghazal Plus test strp) strip by Does Not Apply route See Admin Instructions.  lancets (Accu-Chek Softclix Lancets) misc by Does Not Apply route.  alcohol swabs (Alcohol Prep Pads) padm by Apply Externally route.  Insulin Needles, Disposable, (BD Ultra-Fine Mini Pen Needle) 31 gauge x 3/16\" ndle by Does Not Apply route.  Insulin Syringe-Needle U-100 (Comfort EZ Insulin Syringe) 0.5 mL 30 gauge x 5/16\" syrg by Does Not Apply route. Past History     Past Medical History:  Past Medical History:   Diagnosis Date    Chronic kidney disease     Diabetes ([facility] [address]     Hypercholesterolemia     Hypertension        Past Surgical History:  Past Surgical History:   Procedure Laterality Date    HX CHOLECYSTECTOMY  2010    HX COLONOSCOPY  2013    HX UROLOGICAL  09/24/2010    CYSTO       Family History:  Family History   Problem Relation Age of Onset    Heart Disease Mother     Diabetes Mother     Other Father         anerysm     Diabetes Father        Social History:  Social History     Tobacco Use    Smoking status: Former Smoker     Packs/day: 0.50     Years: 22.00     Pack years: 11.00     Types: Cigarettes    Smokeless tobacco: Never Used   Vaping Use    Vaping Use: Never used   Substance Use Topics    Alcohol use: Not Currently    Drug use: Not Currently       Allergies:  No Known Allergies      Review of Systems   Constitutional: Negative except as in HPI. Eyes: Negative except as in HPI.  ENT: Negative except as in HPI. Cardiovascular: Negative except as in HPI.   Respiratory: Negative except as in HPI.  Gastrointestinal: Negative except as in HPI. Genitourinary: Negative except as in HPI. Musculoskeletal: Negative except as in HPI. Integumentary: Negative except as in HPI. Neurological: Negative except as in HPI. Psychiatric: Negative except as in HPI. Endocrine: Negative except as in HPI. Hematologic/Lymphatic: Negative except as in HPI. Allergic/Immunologic: Negative except as in HPI. Physical Exam   Constitutional: Awake and alert, interactive, NAD  Eyes: PERRL, no injection or scleral icterus, no discharge  HEENT: NCAT, neck supple, MMM, no oropharyngeal exudates  CV: RRR, no m/r/g  Respiratory: CTAB, no r/r/w  GI: Abd soft, nondistended, nontender  : Small hemorrhoid, small amount of old blood on YARIEL mixed with stool. MSK: FROM, no joint effusions or edema  Skin: No rashes  Neuro: CN2-12 intact, symmetric facies, fluent speech. Psych: Well-groomed, normal speech, behavior, appropriate mood    Lab and Diagnostic Study Results     Labs -   No results found for this or any previous visit (from the past 12 hour(s)). Radiologic Studies -   [unfilled]  CT Results  (Last 48 hours)    None        CXR Results  (Last 48 hours)    None          Medical Decision Making and ED Course   - I am the first and primary provider for this patient AND AM THE PRIMARY PROVIDER OF RECORD. - I reviewed the vital signs, available nursing notes, past medical history, past surgical history, family history and social history. - Initial assessment performed. The patients presenting problems have been discussed, and the staff are in agreement with the care plan formulated and outlined with them. I have encouraged them to ask questions as they arise throughout their visit. Vital Signs-Reviewed the patient's vital signs.     Patient Vitals for the past 12 hrs:   Temp Pulse Resp BP SpO2   05/08/22 0709 98.4 °F (36.9 °C) 83 18 (!) 178/75 100 %       EKG interpretation:         Provider Notes (Medical Decision Making):   86M w/BRBPR, HDS, no systemic signs of anemia, however will need repeat colonoscopy given age. Will admit to GI pending workup. ED Course:       ED Course as of 05/08/22 0853   Sun May 08, 2022   0850 HGB(!): 10.5 [YA]   0853 Spoke to Dr. Vivian Parnell, GI on call, will scope tomorrow. Admitted to hospitalist Dr. Lizz Mahajan. [YA]      ED Course User Index  [YA] Marlin Renee MD         Consultations:     GI Dr. Niki Mahajan      Disposition     Disposition: Admitted to Floor Medical Floor the case was discussed with the admitting physician Dr. Lizz Mahajan. Admitted      Diagnosis     Clinical Impression:   1. Bright red blood per rectum        Attestations:     Sandy Ramirez MD

## 2022-05-08 NOTE — ED TRIAGE NOTES
Pt states he noticed blood in his stool this morning. . Denies hemorrhoids. States the blood was bright red.

## 2022-05-09 ENCOUNTER — ANESTHESIA EVENT (OUTPATIENT)
Dept: ENDOSCOPY | Age: 87
DRG: 395 | End: 2022-05-09
Payer: MEDICARE

## 2022-05-09 ENCOUNTER — ANESTHESIA (OUTPATIENT)
Dept: ENDOSCOPY | Age: 87
DRG: 395 | End: 2022-05-09
Payer: MEDICARE

## 2022-05-09 VITALS
DIASTOLIC BLOOD PRESSURE: 64 MMHG | TEMPERATURE: 97.2 F | SYSTOLIC BLOOD PRESSURE: 132 MMHG | BODY MASS INDEX: 35.27 KG/M2 | OXYGEN SATURATION: 97 % | WEIGHT: 238.1 LBS | RESPIRATION RATE: 22 BRPM | HEART RATE: 70 BPM | HEIGHT: 69 IN

## 2022-05-09 LAB
ANION GAP SERPL CALC-SCNC: 1 MMOL/L (ref 5–15)
BASOPHILS # BLD: 0 K/UL (ref 0–0.1)
BASOPHILS NFR BLD: 0 % (ref 0–1)
BUN SERPL-MCNC: 21 MG/DL (ref 6–20)
BUN/CREAT SERPL: 12 (ref 12–20)
CA-I BLD-MCNC: 8.6 MG/DL (ref 8.5–10.1)
CHLORIDE SERPL-SCNC: 112 MMOL/L (ref 97–108)
CO2 SERPL-SCNC: 29 MMOL/L (ref 21–32)
CREAT SERPL-MCNC: 1.72 MG/DL (ref 0.7–1.3)
DIFFERENTIAL METHOD BLD: ABNORMAL
EOSINOPHIL # BLD: 0.2 K/UL (ref 0–0.4)
EOSINOPHIL NFR BLD: 3 % (ref 0–7)
ERYTHROCYTE [DISTWIDTH] IN BLOOD BY AUTOMATED COUNT: 12.5 % (ref 11.5–14.5)
GLUCOSE BLD STRIP.AUTO-MCNC: 92 MG/DL (ref 65–117)
GLUCOSE SERPL-MCNC: 120 MG/DL (ref 65–100)
HCT VFR BLD AUTO: 30.8 % (ref 36.6–50.3)
HGB BLD-MCNC: 10.5 G/DL (ref 12.1–17)
IMM GRANULOCYTES # BLD AUTO: 0 K/UL (ref 0–0.04)
IMM GRANULOCYTES NFR BLD AUTO: 0 % (ref 0–0.5)
LYMPHOCYTES # BLD: 1.1 K/UL (ref 0.8–3.5)
LYMPHOCYTES NFR BLD: 15 % (ref 12–49)
MCH RBC QN AUTO: 31.3 PG (ref 26–34)
MCHC RBC AUTO-ENTMCNC: 34.1 G/DL (ref 30–36.5)
MCV RBC AUTO: 91.9 FL (ref 80–99)
MONOCYTES # BLD: 0.4 K/UL (ref 0–1)
MONOCYTES NFR BLD: 6 % (ref 5–13)
NEUTS SEG # BLD: 5.6 K/UL (ref 1.8–8)
NEUTS SEG NFR BLD: 76 % (ref 32–75)
NRBC # BLD: 0 K/UL (ref 0–0.01)
NRBC BLD-RTO: 0 PER 100 WBC
PERFORMED BY, TECHID: NORMAL
PLATELET # BLD AUTO: 129 K/UL (ref 150–400)
PMV BLD AUTO: 8.8 FL (ref 8.9–12.9)
POTASSIUM SERPL-SCNC: 4.4 MMOL/L (ref 3.5–5.1)
RBC # BLD AUTO: 3.35 M/UL (ref 4.1–5.7)
SODIUM SERPL-SCNC: 142 MMOL/L (ref 136–145)
WBC # BLD AUTO: 7.4 K/UL (ref 4.1–11.1)

## 2022-05-09 PROCEDURE — 74011000250 HC RX REV CODE- 250: Performed by: PHYSICIAN ASSISTANT

## 2022-05-09 PROCEDURE — 2709999900 HC NON-CHARGEABLE SUPPLY: Performed by: INTERNAL MEDICINE

## 2022-05-09 PROCEDURE — 88305 TISSUE EXAM BY PATHOLOGIST: CPT

## 2022-05-09 PROCEDURE — 76060000032 HC ANESTHESIA 0.5 TO 1 HR: Performed by: INTERNAL MEDICINE

## 2022-05-09 PROCEDURE — 85025 COMPLETE CBC W/AUTO DIFF WBC: CPT

## 2022-05-09 PROCEDURE — 74011250637 HC RX REV CODE- 250/637: Performed by: PHYSICIAN ASSISTANT

## 2022-05-09 PROCEDURE — 82962 GLUCOSE BLOOD TEST: CPT

## 2022-05-09 PROCEDURE — 76040000007: Performed by: INTERNAL MEDICINE

## 2022-05-09 PROCEDURE — 36415 COLL VENOUS BLD VENIPUNCTURE: CPT

## 2022-05-09 PROCEDURE — 0DBN8ZX EXCISION OF SIGMOID COLON, VIA NATURAL OR ARTIFICIAL OPENING ENDOSCOPIC, DIAGNOSTIC: ICD-10-PCS | Performed by: INTERNAL MEDICINE

## 2022-05-09 PROCEDURE — 77030009426 HC FCPS BIOP ENDOSC BSC -B: Performed by: INTERNAL MEDICINE

## 2022-05-09 PROCEDURE — 80048 BASIC METABOLIC PNL TOTAL CA: CPT

## 2022-05-09 PROCEDURE — 74011250636 HC RX REV CODE- 250/636: Performed by: NURSE ANESTHETIST, CERTIFIED REGISTERED

## 2022-05-09 RX ORDER — SODIUM CHLORIDE 9 MG/ML
INJECTION, SOLUTION INTRAVENOUS
Status: DISCONTINUED | OUTPATIENT
Start: 2022-05-09 | End: 2022-05-09 | Stop reason: HOSPADM

## 2022-05-09 RX ORDER — PROPOFOL 10 MG/ML
INJECTION, EMULSION INTRAVENOUS AS NEEDED
Status: DISCONTINUED | OUTPATIENT
Start: 2022-05-09 | End: 2022-05-09 | Stop reason: HOSPADM

## 2022-05-09 RX ADMIN — SODIUM CHLORIDE, PRESERVATIVE FREE 10 ML: 5 INJECTION INTRAVENOUS at 04:47

## 2022-05-09 RX ADMIN — PROPOFOL 25 MG: 10 INJECTION, EMULSION INTRAVENOUS at 12:16

## 2022-05-09 RX ADMIN — AMLODIPINE BESYLATE 10 MG: 5 TABLET ORAL at 08:28

## 2022-05-09 RX ADMIN — SODIUM CHLORIDE: 9 INJECTION, SOLUTION INTRAVENOUS at 12:07

## 2022-05-09 RX ADMIN — VALSARTAN 320 MG: 80 TABLET, FILM COATED ORAL at 08:28

## 2022-05-09 RX ADMIN — PROPOFOL 30 MG: 10 INJECTION, EMULSION INTRAVENOUS at 12:10

## 2022-05-09 RX ADMIN — PROPOFOL 25 MG: 10 INJECTION, EMULSION INTRAVENOUS at 12:21

## 2022-05-09 RX ADMIN — SODIUM CHLORIDE, PRESERVATIVE FREE 10 ML: 5 INJECTION INTRAVENOUS at 04:49

## 2022-05-09 RX ADMIN — ATORVASTATIN CALCIUM 10 MG: 10 TABLET, FILM COATED ORAL at 08:32

## 2022-05-09 NOTE — PERIOP NOTES
Attempt to call PACU report to floor nurse Elizabeth Munoz, she was not able to take report at this time. She stated will call me back in 10min.

## 2022-05-09 NOTE — ANESTHESIA PREPROCEDURE EVALUATION
Relevant Problems   CARDIOVASCULAR   (+) Coronary arteriosclerosis   (+) Essential hypertension      RENAL FAILURE   (+) Chronic kidney disease, stage 3 (HCC)      ENDOCRINE   (+) Type 2 diabetes mellitus with chronic kidney disease (HCC)   (+) Type 2 diabetes mellitus without complication (HCC)      HEMATOLOGY   (+) Iron deficiency anemia       Anesthetic History   No history of anesthetic complications            Review of Systems / Medical History  Patient summary reviewed, nursing notes reviewed and pertinent labs reviewed    Pulmonary  Within defined limits                 Neuro/Psych   Within defined limits           Cardiovascular    Hypertension          CAD and hyperlipidemia         GI/Hepatic/Renal         Renal disease: CRI       Endo/Other    Diabetes    Anemia     Other Findings   Comments: Low PLTs         Past Medical History:   Diagnosis Date    Chronic kidney disease     Diabetes ([facility] [address]     Hypercholesterolemia     Hypertension        Past Surgical History:   Procedure Laterality Date    HX CHOLECYSTECTOMY  2010    HX COLONOSCOPY  2013    HX UROLOGICAL  09/24/2010    CYSTO       Current Outpatient Medications   Medication Instructions    amLODIPine-valsartan (EXFORGE)  mg per tablet 1 Tablet, Oral, DAILY    aspirin delayed-release (Ecotrin Low Strength) 81 mg tablet Oral, DAILY    cholecalciferol, vitamin D3, (VITAMIN D3 PO) Oral    furosemide (LASIX) 40 mg, Oral, DAILY    insulin detemir U-100 (LEVEMIR U-100 INSULIN) 14 Units, SubCUTAneous, DAILY, In AM     insulin detemir U-100 (LEVEMIR U-100 INSULIN) 26 Units, SubCUTAneous, EVERY BEDTIME    naproxen (NAPROSYN) 500 mg tablet Take 1 tablet by mouth twice daily as needed    pravastatin (PRAVACHOL) 40 mg, Oral, EVERY BEDTIME    SITagliptin (JANUVIA) 100 mg, Oral, DAILY    tamsulosin (FLOMAX) 0.4 mg, Oral, DAILY    timolol (TIMOPTIC) 0.5 % ophthalmic solution 1 Drop, 2 TIMES DAILY       Current Facility-Administered Medications Medication Dose Route Frequency    sodium chloride (NS) flush 5-40 mL  5-40 mL IntraVENous Q8H    sodium chloride (NS) flush 5-40 mL  5-40 mL IntraVENous PRN    acetaminophen (TYLENOL) tablet 650 mg  650 mg Oral Q6H PRN    Or    acetaminophen (TYLENOL) suppository 650 mg  650 mg Rectal Q6H PRN    polyethylene glycol (MIRALAX) packet 17 g  17 g Oral DAILY PRN    ondansetron (ZOFRAN ODT) tablet 4 mg  4 mg Oral Q8H PRN    Or    ondansetron (ZOFRAN) injection 4 mg  4 mg IntraVENous Q6H PRN    glucose chewable tablet 16 g  4 Tablet Oral PRN    dextrose 10% infusion 0-250 mL  0-250 mL IntraVENous PRN    glucagon (GLUCAGEN) injection 1 mg  1 mg IntraMUSCular PRN    insulin lispro (HUMALOG) injection   SubCUTAneous AC&HS    cholecalciferol (VITAMIN D3) (5000 Units/125 mcg) tablet 5,000 Units  5,000 Units Oral DAILY    furosemide (LASIX) tablet 40 mg  40 mg Oral DAILY    insulin glargine (LANTUS) injection 14 Units  14 Units SubCUTAneous DAILY    insulin glargine (LANTUS) injection 26 Units  26 Units SubCUTAneous QHS    alogliptin (NESINA) tablet 12.5 mg  12.5 mg Oral DAILY    tamsulosin (FLOMAX) capsule 0.4 mg  0.4 mg Oral DAILY    timolol (TIMOPTIC) 0.5 % ophthalmic solution 1 Drop  1 Drop Both Eyes BID    pantoprazole (PROTONIX) 40 mg in 0.9% sodium chloride 10 mL injection  40 mg IntraVENous Q12H    atorvastatin (LIPITOR) tablet 10 mg  10 mg Oral DAILY    amLODIPine (NORVASC) tablet 10 mg  10 mg Oral DAILY    And    valsartan (DIOVAN) tablet 320 mg  320 mg Oral DAILY       Patient Vitals for the past 24 hrs:   Temp Pulse Resp BP SpO2   05/09/22 0801 36.9 °C (98.5 °F) 76 18 (!) 162/86 100 %   05/08/22 2224 36.9 °C (98.4 °F) 74 18 132/69 99 %   05/08/22 1941 36.9 °C (98.5 °F) 77 18 (!) 157/75 97 %   05/08/22 1913 -- 72 18 (!) 150/68 98 %   05/08/22 1733 -- 74 18 (!) 141/71 98 %       Lab Results   Component Value Date/Time    WBC 7.4 05/09/2022 03:50 AM    HGB 10.5 (L) 05/09/2022 03:50 AM    HCT 30.8 (L) 05/09/2022 03:50 AM    PLATELET 697 (L) 14/52/0873 03:50 AM    MCV 91.9 05/09/2022 03:50 AM     Lab Results   Component Value Date/Time    Sodium 142 05/09/2022 03:50 AM    Potassium 4.4 05/09/2022 03:50 AM    Chloride 112 (H) 05/09/2022 03:50 AM    CO2 29 05/09/2022 03:50 AM    Anion gap 1 (L) 05/09/2022 03:50 AM    Glucose 120 (H) 05/09/2022 03:50 AM    BUN 21 (H) 05/09/2022 03:50 AM    Creatinine 1.72 (H) 05/09/2022 03:50 AM    BUN/Creatinine ratio 12 05/09/2022 03:50 AM    GFR est AA 46 (L) 05/09/2022 03:50 AM    GFR est non-AA 38 (L) 05/09/2022 03:50 AM    Calcium 8.6 05/09/2022 03:50 AM     No results found for: APTT, PTP, INR, INREXT  Lab Results   Component Value Date/Time    Glucose 120 (H) 05/09/2022 03:50 AM    Glucose (POC) 92 05/09/2022 07:42 AM     Physical Exam    Airway  Mallampati: II  TM Distance: 4 - 6 cm  Neck ROM: normal range of motion   Mouth opening: Normal     Cardiovascular    Rhythm: regular  Rate: normal         Dental    Dentition: Edentulous     Pulmonary  Breath sounds clear to auscultation               Abdominal  GI exam deferred       Other Findings            Anesthetic Plan    ASA: 3  Anesthesia type: total IV anesthesia and MAC          Induction: Intravenous  Anesthetic plan and risks discussed with: Patient and Family

## 2022-05-09 NOTE — PROGRESS NOTES
OT eval order received and acknowledged. Per patient, nurse, and discussion with PT, pt has been ambulating to the bathroom IND. Pt reports no need for skilled OT services at this time. OT evaluation order will therefore be discontinued as pt has no acute OT needs. Please reorder OT if pt functional status changes. Thank you.

## 2022-05-09 NOTE — DISCHARGE SUMMARY
Hospitalist Discharge Summary     Patient ID:    Issa Murray  [id number]  69 y.o.  1935    Admit date: 5/8/2022    Discharge date : 5/9/2022      Final Diagnoses: Active Problems:    GI bleed (5/8/2022)        Reason for Hospitalization/Hospital Course:   Esther See a 82 y. o. male with a past medical history of insulin-dependent type 2 diabetes, hypercholesterolemia, hypertension, CKD that presented to the emergency room on 5/8/2022 after noticing bright red blood per rectum after using the bathroom around 2 AM. Vanessa Swift denied any nausea, vomiting, abdominal pain, dizziness, lightheadedness, syncopal episode, chest pain, shortness of breath.  He has not noticed any further rectal bleeding since presenting to the ED. Satira Cones is no alleviating or aggravating factors.  In the ED vital signs stable.  Laboratory data was significant for WBC of 10.5, glucose 184, BUN 26, serum creatinine 1.87.  Recommended admission for GI evaluation. Colonoscopy performed revealing diverticulosis and hemorrhoids and a small polyp. He is stable to go home. Discharge Medications:   Current Discharge Medication List      CONTINUE these medications which have NOT CHANGED    Details   !! insulin detemir U-100 (Levemir U-100 Insulin) 100 unit/mL injection 14 Units by SubCUTAneous route daily. In AM      !! insulin detemir U-100 (Levemir U-100 Insulin) 100 unit/mL injection 26 Units by SubCUTAneous route nightly. furosemide (LASIX) 40 mg tablet Take 1 Tablet by mouth daily. Indications: visible water retention  Qty: 90 Tablet, Refills: 1    Associated Diagnoses: Essential hypertension      pravastatin (PRAVACHOL) 40 mg tablet Take 1 Tablet by mouth nightly. Indications: excessive fat in the blood  Qty: 90 Tablet, Refills: 3    Associated Diagnoses: Hyperlipidemia, unspecified hyperlipidemia type      amLODIPine-valsartan (EXFORGE)  mg per tablet Take 1 Tablet by mouth daily.  Indications: high blood pressure  Qty: 90 Tablet, Refills: 3    Associated Diagnoses: Essential hypertension      tamsulosin (Flomax) 0.4 mg capsule Take 1 Capsule by mouth daily. Qty: 90 Capsule, Refills: 3    Associated Diagnoses: Benign prostatic hyperplasia, unspecified whether lower urinary tract symptoms present      aspirin delayed-release (Ecotrin Low Strength) 81 mg tablet Take  by mouth daily. SITagliptin (Januvia) 50 mg tablet Take 100 mg by mouth daily. timolol (TIMOPTIC) 0.5 % ophthalmic solution 1 Drop two (2) times a day. cholecalciferol, vitamin D3, (VITAMIN D3 PO) Take  by mouth. naproxen (NAPROSYN) 500 mg tablet Take 1 tablet by mouth twice daily as needed  Qty: 60 Tab, Refills: 5    Associated Diagnoses: Arthritis       ! ! - Potential duplicate medications found. Please discuss with provider. Follow up Care:    1. Edward Pablo NP in 1-2 weeks. Follow-up Information     Follow up With Specialties Details Why Contact Info    Edward Pablo NP Nurse Practitioner  As needed, If symptoms worsen [place] [id number] [facility]  [phone]              Patient Follow Up Instructions: Activity: Activity as tolerated  Diet:  Cardiac Diet  Wound Care: None needed     Condition at Discharge:  Stable  __________________________________________________________________    Disposition  Home or Self Care  ____________________________________________________________________    Code Status:  Full Code  ___________________________________________________________________    Discharge Exam:  Patient seen and examined by me on discharge day. Pertinent Findings:  Gen:    Not in distress  Chest: Clear lungs  CVS:   Regular rhythm.   No edema  Abd:  Soft, not distended, not tender  Neuro:  Alert        CONSULTATIONS: GI    Significant Diagnostic Studies:   Recent Results (from the past 24 hour(s))   GLUCOSE, POC    Collection Time: 05/08/22  5:25 PM   Result Value Ref Range    Glucose (POC) 124 (H) 65 - 117 mg/dL    Performed by Severiano Gambles    HGB & HCT    Collection Time: 05/08/22  8:39 PM   Result Value Ref Range    HGB 9.9 (L) 12.1 - 17.0 g/dL    HCT 29.1 (L) 36.6 - 50.3 %   GLUCOSE, POC    Collection Time: 05/08/22 10:48 PM   Result Value Ref Range    Glucose (POC) 157 (H) 65 - 117 mg/dL    Performed by Debby Manriquez    METABOLIC PANEL, BASIC    Collection Time: 05/09/22  3:50 AM   Result Value Ref Range    Sodium 142 136 - 145 mmol/L    Potassium 4.4 3.5 - 5.1 mmol/L    Chloride 112 (H) 97 - 108 mmol/L    CO2 29 21 - 32 mmol/L    Anion gap 1 (L) 5 - 15 mmol/L    Glucose 120 (H) 65 - 100 mg/dL    BUN 21 (H) 6 - 20 mg/dL    Creatinine 1.72 (H) 0.70 - 1.30 mg/dL    BUN/Creatinine ratio 12 12 - 20      GFR est AA 46 (L) >60 ml/min/1.73m2    GFR est non-AA 38 (L) >60 ml/min/1.73m2    Calcium 8.6 8.5 - 10.1 mg/dL   CBC WITH AUTOMATED DIFF    Collection Time: 05/09/22  3:50 AM   Result Value Ref Range    WBC 7.4 4.1 - 11.1 K/uL    RBC 3.35 (L) 4.10 - 5.70 M/uL    HGB 10.5 (L) 12.1 - 17.0 g/dL    HCT 30.8 (L) 36.6 - 50.3 %    MCV 91.9 80.0 - 99.0 FL    MCH 31.3 26.0 - 34.0 PG    MCHC 34.1 30.0 - 36.5 g/dL    RDW 12.5 11.5 - 14.5 %    PLATELET 546 (L) 456 - 400 K/uL    MPV 8.8 (L) 8.9 - 12.9 FL    NRBC 0.0 0.0  WBC    ABSOLUTE NRBC 0.00 0.00 - 0.01 K/uL    NEUTROPHILS 76 (H) 32 - 75 %    LYMPHOCYTES 15 12 - 49 %    MONOCYTES 6 5 - 13 %    EOSINOPHILS 3 0 - 7 %    BASOPHILS 0 0 - 1 %    IMMATURE GRANULOCYTES 0 0 - 0.5 %    ABS. NEUTROPHILS 5.6 1.8 - 8.0 K/UL    ABS. LYMPHOCYTES 1.1 0.8 - 3.5 K/UL    ABS. MONOCYTES 0.4 0.0 - 1.0 K/UL    ABS. EOSINOPHILS 0.2 0.0 - 0.4 K/UL    ABS. BASOPHILS 0.0 0.0 - 0.1 K/UL    ABS. IMM.  GRANS. 0.0 0.00 - 0.04 K/UL    DF AUTOMATED     GLUCOSE, POC    Collection Time: 05/09/22  7:42 AM   Result Value Ref Range    Glucose (POC) 92 65 - 117 mg/dL    Performed by Blanche Harper      No orders to display       Time spent in direct and indirect care including coordination of services: Greater than 35 minutes    Signed:  Daylin Castillo NP  5/9/2022  1:24 PM

## 2022-05-09 NOTE — ANESTHESIA POSTPROCEDURE EVALUATION
Procedure(s):  COLONOSCOPY.    total IV anesthesia, MAC    Anesthesia Post Evaluation      Multimodal analgesia: multimodal analgesia not used between 6 hours prior to anesthesia start to PACU discharge  Patient location during evaluation: bedside (Endoscopy suite)  Patient participation: complete - patient cannot participate  Level of consciousness: sleepy but conscious  Pain score: 0  Pain management: adequate  Airway patency: patent  Anesthetic complications: no  Cardiovascular status: acceptable  Respiratory status: acceptable and nasal cannula  Hydration status: acceptable  Comments: This patient remained on the stretcher. The patient was handed off to the endoscopy nursing team.  All questions regarding pre-, intra-, and postoperative care were answered.   Post anesthesia nausea and vomiting:  none  Final Post Anesthesia Temperature Assessment:  Normothermia (36.0-37.5 degrees C)      INITIAL Post-op Vital signs:   Vitals Value Taken Time   /60 05/09/22 1223   Temp     Pulse 78 05/09/22 1223   Resp 12 05/09/22 1223   SpO2 100 % 05/09/22 1223

## 2022-05-09 NOTE — PROGRESS NOTES
Orders received, chart reviewed, patient approached for therapy eval,Patient reported he was tired,Patient also reported he has been walking to bathroom indep and no need for therapy at this point. We will DC PT orders. Please reorder therapy if need be. Thank you.

## 2022-05-09 NOTE — PROGRESS NOTES
Pt is to be discharged home. Pt is alert and oriented with no complaints of pain or SOB. Pt is agreeable to discharge. Pt discharge instructions discussed with patient at bedside including follow up appointments and med changes. Pt iv discontinued and belongings packed up at bedside. Pt transportation is here downstairs. Pt ready for discharge.

## 2022-05-09 NOTE — ROUTINE PROCESS
TRANSFER - OUT REPORT:    Verbal report given to Fabian Forbes (name) on Alyson Parsons  being transferred to  (unit) for routine post - op       Report consisted of patients Situation, Background, Assessment and   Recommendations(SBAR). Information from the following report(s) SBAR, Procedure Summary, Recent Results and Cardiac Rhythm SR was reviewed with the receiving nurse. Opportunity for questions and clarification was provided.       Patient transported with:   Monitor  Tech

## 2022-05-09 NOTE — PROGRESS NOTES
Hospitalist Progress Note       Daily Progress Note: 5/9/2022 12:15 PM  Hospital course:     Gildardo Brian is a 82 y.o. male with a past medical history of insulin-dependent type 2 diabetes, hypercholesterolemia, hypertension, CKD that presented to the emergency room on 5/8/2022 after noticing bright red blood per rectum after using the bathroom around 2 AM.  He denied any nausea, vomiting, abdominal pain, dizziness, lightheadedness, syncopal episode, chest pain, shortness of breath. He has not noticed any further rectal bleeding since presenting to the ED. There is no alleviating or aggravating factors. In the ED vital signs stable. Laboratory data was significant for WBC of 10.5, glucose 184, BUN 26, serum creatinine 1.87. Recommended admission for GI evaluation. Colonoscopy scheduled for today. Subjective:     Evaluated the patient at the bedside. Awaiting colonoscopy. Discussed goals of care with the patient who is eager to go home. Assessment/Plan:   Active Problems:    GI bleed (5/8/2022)    1. Acute GI bleed  2. Insulin-dependent type 2 diabetes  3. Hypertension  4. Hypercholesterolemia  5. BPH without obstructive symptoms  6. CKD stage II    1. Hemoglobin is stable. Hemodynamically stable. H&H every 6 hours for 1 day. On IV Protonix 40 mg twice daily. GI evaluation. Full liquid diet. Possibly colonoscopy tomorrow in the a.m.  2.  Glucose level stable. Continue with Levemir 14 units in a.m. and 26 units in the p.m. Patient takes Januvia 100 mg daily which we will substitute. On insulin sliding scale. Hold if glucose level in the evening is less than 180  3. Patient's blood pressure is stable. Continue to monitor per unit protocol. On amlodipine and losartan. Hold if blood pressures less than 120 or heart rate less than 60  4. Continue pravastatin  5. Flomax  6. Renal function is stable.   Continue to monitor closely    DVT Prophylaxis: SCDs  Code Status: Full Code  POA/NOK:    Disposition and discharge barriers:    Colonoscopy today   Anticipate discharge tomorrow  Care Plan discussed with: Patient, staff, IDR team    Current Facility-Administered Medications   Medication Dose Route Frequency    sodium chloride (NS) flush 5-40 mL  5-40 mL IntraVENous Q8H    sodium chloride (NS) flush 5-40 mL  5-40 mL IntraVENous PRN    acetaminophen (TYLENOL) tablet 650 mg  650 mg Oral Q6H PRN    Or    acetaminophen (TYLENOL) suppository 650 mg  650 mg Rectal Q6H PRN    polyethylene glycol (MIRALAX) packet 17 g  17 g Oral DAILY PRN    ondansetron (ZOFRAN ODT) tablet 4 mg  4 mg Oral Q8H PRN    Or    ondansetron (ZOFRAN) injection 4 mg  4 mg IntraVENous Q6H PRN    glucose chewable tablet 16 g  4 Tablet Oral PRN    dextrose 10% infusion 0-250 mL  0-250 mL IntraVENous PRN    glucagon (GLUCAGEN) injection 1 mg  1 mg IntraMUSCular PRN    insulin lispro (HUMALOG) injection   SubCUTAneous AC&HS    cholecalciferol (VITAMIN D3) (5000 Units/125 mcg) tablet 5,000 Units  5,000 Units Oral DAILY    furosemide (LASIX) tablet 40 mg  40 mg Oral DAILY    insulin glargine (LANTUS) injection 14 Units  14 Units SubCUTAneous DAILY    insulin glargine (LANTUS) injection 26 Units  26 Units SubCUTAneous QHS    alogliptin (NESINA) tablet 12.5 mg  12.5 mg Oral DAILY    tamsulosin (FLOMAX) capsule 0.4 mg  0.4 mg Oral DAILY    timolol (TIMOPTIC) 0.5 % ophthalmic solution 1 Drop  1 Drop Both Eyes BID    pantoprazole (PROTONIX) 40 mg in 0.9% sodium chloride 10 mL injection  40 mg IntraVENous Q12H    atorvastatin (LIPITOR) tablet 10 mg  10 mg Oral DAILY    amLODIPine (NORVASC) tablet 10 mg  10 mg Oral DAILY    And    valsartan (DIOVAN) tablet 320 mg  320 mg Oral DAILY     Facility-Administered Medications Ordered in Other Encounters   Medication Dose Route Frequency    0.9% sodium chloride infusion   IntraVENous CONTINUOUS    propofoL (DIPRIVAN) 10 mg/mL injection   IntraVENous PRN REVIEW OF SYSTEMS    Review of Systems   Constitutional: Positive for malaise/fatigue. Respiratory: Negative for cough and shortness of breath. Cardiovascular: Negative for chest pain. Gastrointestinal: Positive for abdominal pain. Neurological: Positive for weakness. Objective:     Visit Vitals  BP (!) 162/86 (BP 1 Location: Left arm, BP Patient Position: At rest)   Pulse 76   Temp 98.5 °F (36.9 °C)   Resp 18   Ht 5' 9\" (1.753 m)   Wt 108 kg (238 lb 1.6 oz)   SpO2 100%   BMI 35.16 kg/m²      O2 Device: None (Room air)    Temp (24hrs), Av.5 °F (36.9 °C), Min:98.4 °F (36.9 °C), Max:98.5 °F (36.9 °C)      No intake/output data recorded. No intake/output data recorded. PHYSICAL EXAM:    Physical Exam  Constitutional:       Appearance: He is obese. He is ill-appearing. Cardiovascular:      Rate and Rhythm: Normal rate and regular rhythm. Pulmonary:      Effort: No respiratory distress. Abdominal:      General: There is distension. Neurological:      Motor: Weakness present.           Data Review    Recent Results (from the past 24 hour(s))   GLUCOSE, POC    Collection Time: 22  5:25 PM   Result Value Ref Range    Glucose (POC) 124 (H) 65 - 117 mg/dL    Performed by Raphael King    HGB & HCT    Collection Time: 22  8:39 PM   Result Value Ref Range    HGB 9.9 (L) 12.1 - 17.0 g/dL    HCT 29.1 (L) 36.6 - 50.3 %   GLUCOSE, POC    Collection Time: 22 10:48 PM   Result Value Ref Range    Glucose (POC) 157 (H) 65 - 117 mg/dL    Performed by Jessi Muller    METABOLIC PANEL, BASIC    Collection Time: 22  3:50 AM   Result Value Ref Range    Sodium 142 136 - 145 mmol/L    Potassium 4.4 3.5 - 5.1 mmol/L    Chloride 112 (H) 97 - 108 mmol/L    CO2 29 21 - 32 mmol/L    Anion gap 1 (L) 5 - 15 mmol/L    Glucose 120 (H) 65 - 100 mg/dL    BUN 21 (H) 6 - 20 mg/dL    Creatinine 1.72 (H) 0.70 - 1.30 mg/dL    BUN/Creatinine ratio 12 12 - 20      GFR est AA 46 (L) >60 ml/min/1.73m2 GFR est non-AA 38 (L) >60 ml/min/1.73m2    Calcium 8.6 8.5 - 10.1 mg/dL   CBC WITH AUTOMATED DIFF    Collection Time: 05/09/22  3:50 AM   Result Value Ref Range    WBC 7.4 4.1 - 11.1 K/uL    RBC 3.35 (L) 4.10 - 5.70 M/uL    HGB 10.5 (L) 12.1 - 17.0 g/dL    HCT 30.8 (L) 36.6 - 50.3 %    MCV 91.9 80.0 - 99.0 FL    MCH 31.3 26.0 - 34.0 PG    MCHC 34.1 30.0 - 36.5 g/dL    RDW 12.5 11.5 - 14.5 %    PLATELET 134 (L) 226 - 400 K/uL    MPV 8.8 (L) 8.9 - 12.9 FL    NRBC 0.0 0.0  WBC    ABSOLUTE NRBC 0.00 0.00 - 0.01 K/uL    NEUTROPHILS 76 (H) 32 - 75 %    LYMPHOCYTES 15 12 - 49 %    MONOCYTES 6 5 - 13 %    EOSINOPHILS 3 0 - 7 %    BASOPHILS 0 0 - 1 %    IMMATURE GRANULOCYTES 0 0 - 0.5 %    ABS. NEUTROPHILS 5.6 1.8 - 8.0 K/UL    ABS. LYMPHOCYTES 1.1 0.8 - 3.5 K/UL    ABS. MONOCYTES 0.4 0.0 - 1.0 K/UL    ABS. EOSINOPHILS 0.2 0.0 - 0.4 K/UL    ABS. BASOPHILS 0.0 0.0 - 0.1 K/UL    ABS. IMM. GRANS. 0.0 0.00 - 0.04 K/UL    DF AUTOMATED     GLUCOSE, POC    Collection Time: 05/09/22  7:42 AM   Result Value Ref Range    Glucose (POC) 92 65 - 117 mg/dL    Performed by Augustine Mancia        No orders to display             _____________________________________________________________________________  Time spent in direct care including coordination of service, review of data and examination: > 35 minutes    ______________________________________________________________________________    Jordan Colbert NP    This is dictation was done by [employer], computer voice recognition software. Quite often unanticipated grammatical, syntax, homophones and other interpretive errors or inadvertently transcribed by the computer software. Please excuse errors that have escaped final proofreading. Thank you.

## 2022-05-09 NOTE — PROGRESS NOTES
Problem: Diabetes Self-Management  Goal: *Disease process and treatment process  Description: Define diabetes and identify own type of diabetes; list 3 options for treating diabetes.   Outcome: Progressing Towards Goal     Problem: Upper and Lower GI Bleed: Day 1  Goal: *Optimal pain control at patient's stated goal  Outcome: Progressing Towards Goal  Goal: *Hemodynamically stable  Outcome: Progressing Towards Goal

## 2022-05-09 NOTE — PROGRESS NOTES
Reason for Admission:  GI bleed                   RUR Score:  9%                  Plan for utilizing home health:  None        PCP: First and Last name:  Dave Osorio NP     Name of Practice:    Are you a current patient: Yes/No:    Approximate date of last visit: A few months ago. Can you participate in a virtual visit with your PCP:                     Current Advanced Directive/Advance Care Plan: Full Code    Healthcare Decision Maker:   Click here to complete [id number] including selection of the Healthcare Decision Maker Relationship (ie \"Primary\")           Daughter: Kadi Raza- [phone]                  Transition of Care Plan: CM met with patient at bedside to complete assessment. Address confirmed on chart. Patient lives with his ex-wife. Patient drives, uses no DME and is independent in all ADL's. Patient states that she will call his daughter to pick him up when ready for DC. DC order noted in system for home, self care. Discharge plan of care/case management plan validated with provider discharge order.

## 2022-05-10 NOTE — PROGRESS NOTES
Physician Progress Note      PATIENT:               Andre Perales  CSN #:                  [id number]  :                       1935  ADMIT DATE:       2022 7:10 AM  DISCH DATE:        2022 4:26 PM  RESPONDING  PROVIDER #:        Abby Le NP          QUERY TEXT:    Patient admitted with GI bleeding, noted to have colon polyps, hemorrhoids, and diverticulitis of the large intestine. If possible, please document in progress notes and discharge summary the cause of the GI bleeding: The medical record reflects the following:  Risk Factors: 87yo with bright read blood in stool and previous polypectomy    Clinical Indicators:  Triage nurse  Pt states he noticed blood in his stool this morning. . Denies hemorrhoids. States the blood was bright red    ED  Onset 2AM when he went to the bathroom. Last time he had this was after polypectomy 2-3 years ago. No colonoscopy since. GI  Patient presented to the emergency room for bright red blood per rectum  Multiple episode, no severe abdominal pain, no lightheadedness, he was brought emergency for evaluation, emergency room hemoglobin around 10, no CT has been performed, no nausea vomiting, hemodynamic stable,  He has not noticed any further rectal bleeding since presenting to the ED.     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     Query created by: Soraya Baeza on 5/10/2022 12:52 PM      Electronically signed by:  Abby Le NP 5/10/2022 2:45 PM

## 2022-05-17 ENCOUNTER — TELEPHONE (OUTPATIENT)
Dept: FAMILY MEDICINE CLINIC | Age: 87
End: 2022-05-17

## 2022-05-17 NOTE — TELEPHONE ENCOUNTER
Called patient and left VM on both numbers in chart to call back to the office to schedule a hospital follow up appt for next week

## 2022-05-24 ENCOUNTER — OFFICE VISIT (OUTPATIENT)
Dept: FAMILY MEDICINE CLINIC | Age: 87
End: 2022-05-24
Payer: MEDICARE

## 2022-05-24 VITALS
BODY MASS INDEX: 35.84 KG/M2 | HEART RATE: 78 BPM | OXYGEN SATURATION: 98 % | TEMPERATURE: 97.7 F | RESPIRATION RATE: 18 BRPM | HEIGHT: 69 IN | DIASTOLIC BLOOD PRESSURE: 66 MMHG | SYSTOLIC BLOOD PRESSURE: 153 MMHG | WEIGHT: 242 LBS

## 2022-05-24 DIAGNOSIS — Z09 HOSPITAL DISCHARGE FOLLOW-UP: ICD-10-CM

## 2022-05-24 DIAGNOSIS — E78.5 HYPERLIPIDEMIA, UNSPECIFIED HYPERLIPIDEMIA TYPE: ICD-10-CM

## 2022-05-24 DIAGNOSIS — H60.591 ACUTE IRRITANT OTITIS EXTERNA, RIGHT: Primary | ICD-10-CM

## 2022-05-24 DIAGNOSIS — N18.30 STAGE 3 CHRONIC KIDNEY DISEASE, UNSPECIFIED WHETHER STAGE 3A OR 3B CKD (HCC): ICD-10-CM

## 2022-05-24 DIAGNOSIS — I10 ESSENTIAL HYPERTENSION: ICD-10-CM

## 2022-05-24 DIAGNOSIS — D64.9 ANEMIA, UNSPECIFIED TYPE: ICD-10-CM

## 2022-05-24 DIAGNOSIS — E11.22 TYPE 2 DIABETES MELLITUS WITH CHRONIC KIDNEY DISEASE, WITH LONG-TERM CURRENT USE OF INSULIN, UNSPECIFIED CKD STAGE (HCC): ICD-10-CM

## 2022-05-24 DIAGNOSIS — Z79.4 TYPE 2 DIABETES MELLITUS WITH CHRONIC KIDNEY DISEASE, WITH LONG-TERM CURRENT USE OF INSULIN, UNSPECIFIED CKD STAGE (HCC): ICD-10-CM

## 2022-05-24 PROCEDURE — G8427 DOCREV CUR MEDS BY ELIG CLIN: HCPCS | Performed by: NURSE PRACTITIONER

## 2022-05-24 PROCEDURE — 1111F DSCHRG MED/CURRENT MED MERGE: CPT | Performed by: NURSE PRACTITIONER

## 2022-05-24 PROCEDURE — 1101F PT FALLS ASSESS-DOCD LE1/YR: CPT | Performed by: NURSE PRACTITIONER

## 2022-05-24 PROCEDURE — G8432 DEP SCR NOT DOC, RNG: HCPCS | Performed by: NURSE PRACTITIONER

## 2022-05-24 PROCEDURE — G8536 NO DOC ELDER MAL SCRN: HCPCS | Performed by: NURSE PRACTITIONER

## 2022-05-24 PROCEDURE — G8417 CALC BMI ABV UP PARAM F/U: HCPCS | Performed by: NURSE PRACTITIONER

## 2022-05-24 PROCEDURE — 99214 OFFICE O/P EST MOD 30 MIN: CPT | Performed by: NURSE PRACTITIONER

## 2022-05-24 PROCEDURE — 1124F ACP DISCUSS-NO DSCNMKR DOCD: CPT | Performed by: NURSE PRACTITIONER

## 2022-05-24 PROCEDURE — G8754 DIAS BP LESS 90: HCPCS | Performed by: NURSE PRACTITIONER

## 2022-05-24 PROCEDURE — G8753 SYS BP > OR = 140: HCPCS | Performed by: NURSE PRACTITIONER

## 2022-05-24 PROCEDURE — 3044F HG A1C LEVEL LT 7.0%: CPT | Performed by: NURSE PRACTITIONER

## 2022-05-24 RX ORDER — ACETIC ACID 20.65 MG/ML
4 SOLUTION AURICULAR (OTIC) 3 TIMES DAILY
Qty: 15 ML | Refills: 0 | Status: SHIPPED | OUTPATIENT
Start: 2022-05-24 | End: 2022-05-31

## 2022-05-24 NOTE — PROGRESS NOTES
Fadi Argueta (: 1935) is a 82 y.o. male, established patient, here for evaluation of the following chief complaint(s):  Hospital Follow Up         ASSESSMENT/PLAN:  Below is the assessment and plan developed based on review of pertinent history, physical exam, labs, studies, and medications. Based on today's findings we will proceed as follows:     1. Acute irritant otitis externa, right  -     acetic acid (VOSOL) 2 % otic solution; Administer 4 Drops in right ear three (3) times daily for 7 days. , Normal, Disp-15 mL, R-0  2. Type 2 diabetes mellitus with chronic kidney disease, with long-term current use of insulin, unspecified CKD stage (HCC)  -     HEMOGLOBIN A1C WITH EAG  3. Stage 3 chronic kidney disease, unspecified whether stage 3a or 3b CKD ([facility] [address]  4. Hyperlipidemia, unspecified hyperlipidemia type  -     AL DISCHARGE MEDS RECONCILED W/ CURRENT OUTPATIENT MED LIST  5. Essential hypertension  -     CBC WITH AUTOMATED DIFF  -     METABOLIC PANEL, COMPREHENSIVE  -     LIPID PANEL  -     THYROID CASCADE PROFILE  -     MICROALBUMIN, UR, RAND W/ MICROALB/CREAT RATIO  -     AL DISCHARGE MEDS RECONCILED W/ CURRENT OUTPATIENT MED LIST  6. Anemia, unspecified type  -     AL DISCHARGE MEDS RECONCILED W/ CURRENT OUTPATIENT MED LIST  7. Hospital discharge follow-up  -     AL DISCHARGE MEDS RECONCILED W/ CURRENT OUTPATIENT MED LIST      Educated on medication, uses, side effects, as well as signs and symptoms that may merit immediate medical attention. Patient is not to share medication and use only as indicated. Patient verbalized understanding and agreement. If symptoms dont improve or worsen call the clinic or go to urgent in the 2-3 days for further evaluation    Return in about 2 months (around 2022) for post GI bleed lab results, R ear impacted cerumen. SUBJECTIVE/OBJECTIVE:  HPI    Patient presenting for hypertension followup, diabetes follow up, hyperlipidemia check. Hospital Follow Up ( went to the hospital f/u GI bleed) No GI f/u, feeling improved  R ear cant hear  Patient reports blood pressures at home most frequently normal. Patient has symptoms of none of the following; no chest pain, shortness of breath, weakness, orthostatic hypotension, cough, myalgias, rash, headaches, weight gain, leg swelling, palpitations, slow heart rate, fatigue, depression. Patient reports good compliance with medications, no side effects from medications noted. Current treatments include diet modification, weight loss. Review of Systems  All other systems reviewed and are negative. Visit Vitals  BP (!) 153/66 (BP 1 Location: Left upper arm)   Pulse 78   Temp 97.7 °F (36.5 °C) (Temporal)   Resp 18   Ht 5' 9\" (1.753 m)   Wt 242 lb (109.8 kg)   SpO2 98%   BMI 35.74 kg/m²       Physical Exam  Constitutional:  No acute distress  HEENT:  Head normocephalic and atraumatic. CV:  Regular rate and rhythm. No murmur. Respiratory:  Lungs clear to auscultation bilaterally  Abdomen:  Soft, non-tender. Skin:  Normal color. Warm and Dry  Extremities:  Non-tender. No pedal edema. Back:  No tenderness  Neuro:  No gross motor deficits    On this date 05/24/2022 I have spent 33 minutes reviewing previous notes, test results and face to face with the patient discussing the diagnosis and importance of compliance with the treatment plan as well as documenting on the day of the visit. Aspects of this note may have been generated using voice recognition software. Despite editing, there may be some syntax errors. An electronic signature was used to authenticate this note.   -- Jaimie Child NP

## 2022-05-24 NOTE — PROGRESS NOTES
Chief Complaint   Patient presents with   [facility] Follow Up      went to the hospital f/u GI bleed     Visit Vitals  BP (!) 159/96 (BP 1 Location: Left upper arm, BP Patient Position: Sitting)   Pulse 78   Temp 97.7 °F (36.5 °C) (Temporal)   Resp 18   Ht 5' 9\" (1.753 m)   Wt 242 lb (109.8 kg)   SpO2 98%   BMI 35.74 kg/m²       1. Have you been to the ER, urgent care clinic since your last visit? Hospitalized since your last visit? Yes When: last month   GI bleeding   2. Have you seen or consulted any other health care providers outside of the [address] [place] [address] since your last visit? Include any pap smears or colon screening.  Yes When: last month

## 2022-05-26 ENCOUNTER — TELEPHONE (OUTPATIENT)
Dept: FAMILY MEDICINE CLINIC | Age: 87
End: 2022-05-26

## 2022-05-26 LAB
ALBUMIN SERPL-MCNC: 4 G/DL (ref 3.6–4.6)
ALBUMIN/CREAT UR: 25 MG/G CREAT (ref 0–29)
ALBUMIN/GLOB SERPL: 2 {RATIO} (ref 1.2–2.2)
ALP SERPL-CCNC: 91 IU/L (ref 44–121)
ALT SERPL-CCNC: 11 IU/L (ref 0–44)
AST SERPL-CCNC: 7 IU/L (ref 0–40)
BASOPHILS # BLD AUTO: 0 X10E3/UL (ref 0–0.2)
BASOPHILS NFR BLD AUTO: 0 %
BILIRUB SERPL-MCNC: 0.5 MG/DL (ref 0–1.2)
BUN SERPL-MCNC: 21 MG/DL (ref 8–27)
BUN/CREAT SERPL: 12 (ref 10–24)
CALCIUM SERPL-MCNC: 8.3 MG/DL (ref 8.6–10.2)
CHLORIDE SERPL-SCNC: 105 MMOL/L (ref 96–106)
CHOLEST SERPL-MCNC: 133 MG/DL (ref 100–199)
CO2 SERPL-SCNC: 23 MMOL/L (ref 20–29)
CREAT SERPL-MCNC: 1.77 MG/DL (ref 0.76–1.27)
CREAT UR-MCNC: 190.6 MG/DL
EGFR: 37 ML/MIN/1.73
EOSINOPHIL # BLD AUTO: 0.2 X10E3/UL (ref 0–0.4)
EOSINOPHIL NFR BLD AUTO: 2 %
ERYTHROCYTE [DISTWIDTH] IN BLOOD BY AUTOMATED COUNT: 11.9 % (ref 11.6–15.4)
EST. AVERAGE GLUCOSE BLD GHB EST-MCNC: 114 MG/DL
GLOBULIN SER CALC-MCNC: 2 G/DL (ref 1.5–4.5)
GLUCOSE SERPL-MCNC: 131 MG/DL (ref 65–99)
HBA1C MFR BLD: 5.6 % (ref 4.8–5.6)
HCT VFR BLD AUTO: 30.6 % (ref 37.5–51)
HDLC SERPL-MCNC: 45 MG/DL
HGB BLD-MCNC: 10.4 G/DL (ref 13–17.7)
IMM GRANULOCYTES # BLD AUTO: 0 X10E3/UL (ref 0–0.1)
IMM GRANULOCYTES NFR BLD AUTO: 0 %
LDLC SERPL CALC-MCNC: 73 MG/DL (ref 0–99)
LYMPHOCYTES # BLD AUTO: 1.1 X10E3/UL (ref 0.7–3.1)
LYMPHOCYTES NFR BLD AUTO: 14 %
MCH RBC QN AUTO: 30.8 PG (ref 26.6–33)
MCHC RBC AUTO-ENTMCNC: 34 G/DL (ref 31.5–35.7)
MCV RBC AUTO: 91 FL (ref 79–97)
MICROALBUMIN UR-MCNC: 46.8 UG/ML
MONOCYTES # BLD AUTO: 0.4 X10E3/UL (ref 0.1–0.9)
MONOCYTES NFR BLD AUTO: 5 %
NEUTROPHILS # BLD AUTO: 6.1 X10E3/UL (ref 1.4–7)
NEUTROPHILS NFR BLD AUTO: 79 %
PLATELET # BLD AUTO: 150 X10E3/UL (ref 150–450)
POTASSIUM SERPL-SCNC: 4 MMOL/L (ref 3.5–5.2)
PROT SERPL-MCNC: 6 G/DL (ref 6–8.5)
RBC # BLD AUTO: 3.38 X10E6/UL (ref 4.14–5.8)
SODIUM SERPL-SCNC: 143 MMOL/L (ref 134–144)
TRIGL SERPL-MCNC: 76 MG/DL (ref 0–149)
TSH SERPL DL<=0.005 MIU/L-ACNC: 2.15 UIU/ML (ref 0.45–4.5)
VLDLC SERPL CALC-MCNC: 15 MG/DL (ref 5–40)
WBC # BLD AUTO: 7.7 X10E3/UL (ref 3.4–10.8)

## 2022-05-26 RX ORDER — EZETIMIBE 10 MG/1
10 TABLET ORAL DAILY
COMMUNITY

## 2022-06-21 DIAGNOSIS — E11.22 TYPE 2 DIABETES MELLITUS WITH CHRONIC KIDNEY DISEASE, WITH LONG-TERM CURRENT USE OF INSULIN, UNSPECIFIED CKD STAGE (HCC): Primary | ICD-10-CM

## 2022-06-21 DIAGNOSIS — Z79.4 TYPE 2 DIABETES MELLITUS WITH CHRONIC KIDNEY DISEASE, WITH LONG-TERM CURRENT USE OF INSULIN, UNSPECIFIED CKD STAGE (HCC): Primary | ICD-10-CM

## 2022-06-21 RX ORDER — INSULIN LISPRO 100 [IU]/ML
10 INJECTION, SOLUTION INTRAVENOUS; SUBCUTANEOUS
COMMUNITY
End: 2022-06-21 | Stop reason: SDUPTHER

## 2022-06-21 RX ORDER — INSULIN LISPRO 100 [IU]/ML
10 INJECTION, SOLUTION INTRAVENOUS; SUBCUTANEOUS
Qty: 10 ML | Refills: 2 | Status: SHIPPED | OUTPATIENT
Start: 2022-06-21 | End: 2022-10-14

## 2022-06-27 ENCOUNTER — TRANSCRIBE ORDER (OUTPATIENT)
Dept: SCHEDULING | Age: 87
End: 2022-06-27

## 2022-06-27 DIAGNOSIS — R06.02 SHORTNESS OF BREATH: Primary | ICD-10-CM

## 2022-06-27 DIAGNOSIS — I10 ESSENTIAL HYPERTENSION, MALIGNANT: ICD-10-CM

## 2022-06-27 DIAGNOSIS — I50.32 CHRONIC DIASTOLIC HEART FAILURE (HCC): ICD-10-CM

## 2022-07-11 DIAGNOSIS — Z79.4 TYPE 2 DIABETES MELLITUS WITH CHRONIC KIDNEY DISEASE, WITH LONG-TERM CURRENT USE OF INSULIN, UNSPECIFIED CKD STAGE (HCC): Primary | ICD-10-CM

## 2022-07-11 DIAGNOSIS — E11.22 TYPE 2 DIABETES MELLITUS WITH CHRONIC KIDNEY DISEASE, WITH LONG-TERM CURRENT USE OF INSULIN, UNSPECIFIED CKD STAGE (HCC): Primary | ICD-10-CM

## 2022-07-11 RX ORDER — INSULIN GLARGINE 100 [IU]/ML
40 INJECTION, SOLUTION SUBCUTANEOUS
COMMUNITY
End: 2022-07-11 | Stop reason: SDUPTHER

## 2022-07-11 NOTE — TELEPHONE ENCOUNTER
Pt came by office today as he is having issues getting in touch with [facility] to refill his insulin.  He is asking for us to sent him some to the [address] [place] [address] Street

## 2022-07-12 RX ORDER — INSULIN GLARGINE 100 [IU]/ML
40 INJECTION, SOLUTION SUBCUTANEOUS
Qty: 5 EACH | Refills: 0 | Status: SHIPPED | OUTPATIENT
Start: 2022-07-12 | End: 2022-08-17

## 2022-07-12 RX ORDER — EZETIMIBE 10 MG/1
10 TABLET ORAL DAILY
Qty: 90 TABLET | Refills: 1 | Status: CANCELLED | OUTPATIENT
Start: 2022-07-12

## 2022-08-08 ENCOUNTER — TELEPHONE (OUTPATIENT)
Dept: FAMILY MEDICINE CLINIC | Age: 87
End: 2022-08-08

## 2022-08-10 ENCOUNTER — TELEPHONE (OUTPATIENT)
Dept: FAMILY MEDICINE CLINIC | Age: 87
End: 2022-08-10

## 2022-08-15 DIAGNOSIS — E11.22 TYPE 2 DIABETES MELLITUS WITH CHRONIC KIDNEY DISEASE, WITH LONG-TERM CURRENT USE OF INSULIN, UNSPECIFIED CKD STAGE (HCC): Primary | ICD-10-CM

## 2022-08-15 DIAGNOSIS — Z79.4 TYPE 2 DIABETES MELLITUS WITH CHRONIC KIDNEY DISEASE, WITH LONG-TERM CURRENT USE OF INSULIN, UNSPECIFIED CKD STAGE (HCC): Primary | ICD-10-CM

## 2022-08-15 NOTE — TELEPHONE ENCOUNTER
Patient came in and stated he is out of the medication Insulin Lispro Pen 100/ml inj lil. Patient stated [facility] does not cover this medication anymore. Please order him whatever the insurance will cover. Please call patient and advise when this will be ordered.

## 2022-08-16 DIAGNOSIS — E11.22 TYPE 2 DIABETES MELLITUS WITH CHRONIC KIDNEY DISEASE, WITH LONG-TERM CURRENT USE OF INSULIN, UNSPECIFIED CKD STAGE (HCC): ICD-10-CM

## 2022-08-16 DIAGNOSIS — Z79.4 TYPE 2 DIABETES MELLITUS WITH CHRONIC KIDNEY DISEASE, WITH LONG-TERM CURRENT USE OF INSULIN, UNSPECIFIED CKD STAGE (HCC): ICD-10-CM

## 2022-08-17 RX ORDER — INSULIN GLARGINE 100 [IU]/ML
INJECTION, SOLUTION SUBCUTANEOUS
Qty: 15 ML | Refills: 0 | Status: SHIPPED | OUTPATIENT
Start: 2022-08-17

## 2022-08-17 NOTE — TELEPHONE ENCOUNTER
Spoke to provider and verbal order given for lantus 40 units subq qhs dispense 15ml with no refills.  Brian Cooks DNP/JACINTO Barboza LPN

## 2022-08-18 NOTE — TELEPHONE ENCOUNTER
Patient called and stated we ordered the long term insulin and he needs the short term humolog insulin. insulin lispro (HUMALOG) 100 unit/mL Joseph Espinoza [[id number]      Patient states insurance will not pay need to see what insurance will pay. Nurse to call patient.

## 2022-08-18 NOTE — TELEPHONE ENCOUNTER
Real-  with [employer] called with Omid Bravo on the phone and states he needs the short term insulin as soon as possible due to blood sugar 350. Please have nurse call him.

## 2022-08-19 RX ORDER — INSULIN LISPRO 100 [IU]/ML
10 INJECTION, SOLUTION INTRAVENOUS; SUBCUTANEOUS
Qty: 10 ML | Refills: 3
Start: 2022-08-19 | End: 2022-10-14

## 2022-08-19 NOTE — TELEPHONE ENCOUNTER
Pt has order for humalog pen but insurance will not cover the pen. Health  said that humalog in the vial should be cover. PT is asking if that can be sent in place.  He was previously part of an RX assistance program but now no longer is receiving assistance

## 2022-08-22 PROBLEM — R35.1 NOCTURIA: Status: ACTIVE | Noted: 2020-08-04

## 2022-08-22 NOTE — PROGRESS NOTES
HISTORY OF PRESENT ILLNESS  Nenita Clark is a 82 y.o. male. Chief Complaint   Patient presents with    Follow-up    Benign Prostatic Hypertrophy    Urinary Frequency    Nocturia     Past Medical History:  PMHx (including negatives):  has a past medical history of Chronic kidney disease, Diabetes ([address] Hypercholesterolemia, and Hypertension. PSurgHx:  has a past surgical history that includes hx urological (09/24/2010); hx cholecystectomy (2010); hx colonoscopy (2013); and colonoscopy (N/A, 5/9/2022). PSocHx:  reports that he has quit smoking. His smoking use included cigarettes. He has a 11.00 pack-year smoking history. He has never used smokeless tobacco. He reports that he does not currently use alcohol. He reports that he does not currently use drugs. Annual follow up for urinary issues including BPH, nocturia, and increased urinary frequency. He is managed with tamsulosin and finasteride. He is up 3-4x at night. Chronic Conditions Addressed Today       1. BPH (benign prostatic hyperplasia) - Primary     Overview      Previously seen/managed by Dr. Kiran Martinez for BPH on tamsulsoin and finasteride. He was last seen on 7/9/2019 by Dr. Yohana Ford. PSA ranged from 0.51-2.0 ([phone]). Current Assessment & Plan       Stable to improved. Continue medications          Relevant Medications     tamsulosin (Flomax) 0.4 mg capsule     finasteride (PROSCAR) 5 mg tablet    2. Increased frequency of urination     Overview      2021: Urinary issues include BPH, and increased urinary frequency. He is managed with tamsulosin and finasteride. He is on many medications including Lasix. He is up 3-4x at night. He has a full amount but is not sure he empties. He is not bothered. Current Assessment & Plan       Nocturia has decreased to 2-3x per night. He is not bothered         3. Nocturia     Overview      Urinary issues include BPH, and increased urinary frequency.      He is managed with tamsulosin and finasteride. He is on many medications including Lasix. He is up 3-4x at night. He has a full amount but is not sure he empties. He is not bothered. Current Assessment & Plan      Nocturia 2-3x. He drinks a cup of water with medication before bed. He is not bothered. Review of Systems   All other systems reviewed and are negative. Patient denies the symptoms of COVID-19 per routine screening guidelines. Physical Exam  Constitutional:       Appearance: He is obese. He is not ill-appearing. Neurological:      Mental Status: He is alert. ASSESSMENT and PLAN  Diagnoses and all orders for this visit:    1. Benign prostatic hyperplasia, unspecified whether lower urinary tract symptoms present  Assessment & Plan:   Stable to improved. Continue medications    Orders:  -     tamsulosin (Flomax) 0.4 mg capsule; Take 1 Capsule by mouth daily. 2. Increased frequency of urination  Assessment & Plan:   Nocturia has decreased to 2-3x per night. He is not bothered      3. Nocturia  Assessment & Plan:  Nocturia 2-3x. He drinks a cup of water with medication before bed. He is not bothered. Other orders  -     finasteride (PROSCAR) 5 mg tablet; Take 1 Tablet by mouth daily. Follow-up and Dispositions    Return in about 1 year (around 8/26/2023). Magda Jerez may have a reminder for a \"due or due soon\" health maintenance. The patient has been encouraged to contact their primary care provider for follow-up on this health maintenance or other necessary and/or routine health screening.      Noy Robledo MD

## 2022-08-26 ENCOUNTER — OFFICE VISIT (OUTPATIENT)
Dept: UROLOGY | Age: 87
End: 2022-08-26
Payer: MEDICARE

## 2022-08-26 VITALS — BODY MASS INDEX: 36.29 KG/M2 | HEIGHT: 69 IN | WEIGHT: 245 LBS

## 2022-08-26 DIAGNOSIS — R35.0 INCREASED FREQUENCY OF URINATION: ICD-10-CM

## 2022-08-26 DIAGNOSIS — N40.0 BENIGN PROSTATIC HYPERPLASIA, UNSPECIFIED WHETHER LOWER URINARY TRACT SYMPTOMS PRESENT: Primary | ICD-10-CM

## 2022-08-26 DIAGNOSIS — R35.1 NOCTURIA: ICD-10-CM

## 2022-08-26 PROCEDURE — G8427 DOCREV CUR MEDS BY ELIG CLIN: HCPCS | Performed by: UROLOGY

## 2022-08-26 PROCEDURE — G8417 CALC BMI ABV UP PARAM F/U: HCPCS | Performed by: UROLOGY

## 2022-08-26 PROCEDURE — G8432 DEP SCR NOT DOC, RNG: HCPCS | Performed by: UROLOGY

## 2022-08-26 PROCEDURE — G8756 NO BP MEASURE DOC: HCPCS | Performed by: UROLOGY

## 2022-08-26 PROCEDURE — 99214 OFFICE O/P EST MOD 30 MIN: CPT | Performed by: UROLOGY

## 2022-08-26 PROCEDURE — G8536 NO DOC ELDER MAL SCRN: HCPCS | Performed by: UROLOGY

## 2022-08-26 RX ORDER — TAMSULOSIN HYDROCHLORIDE 0.4 MG/1
0.4 CAPSULE ORAL DAILY
Qty: 90 CAPSULE | Refills: 3 | Status: SHIPPED | OUTPATIENT
Start: 2022-08-26

## 2022-08-26 RX ORDER — FINASTERIDE 5 MG/1
5 TABLET, FILM COATED ORAL DAILY
Qty: 90 TABLET | Refills: 3 | Status: SHIPPED | OUTPATIENT
Start: 2022-08-26

## 2022-08-26 NOTE — LETTER
8/26/2022    Patient: Enrico Broussard   YOB: 1935   Date of Visit: 7/02/1876     Roger King NP  [address]  [place]    Dear Roger King NP,      Thank you for referring Mr. Enrico Broussard to [place] [address] for evaluation. My notes for this consultation are attached. If you have questions, please do not hesitate to call me. I look forward to following your patient along with you.       Sincerely,    Michelle Simmons MD

## 2022-08-26 NOTE — PROGRESS NOTES
Chief Complaint   Patient presents with    Follow-up    Benign Prostatic Hypertrophy    Urinary Frequency    Nocturia     1. Have you been to the ER, urgent care clinic since your last visit? Hospitalized since your last visit? No    2. Have you seen or consulted any other health care providers outside of the [address] [place] [address] since your last visit? Include any pap smears or colon screening.  No  Visit Vitals  Ht 5' 9\" (1.753 m)   Wt 245 lb (111.1 kg)   BMI 36.18 kg/m²

## 2022-09-21 ENCOUNTER — OFFICE VISIT (OUTPATIENT)
Dept: PODIATRY | Age: 87
End: 2022-09-21
Payer: MEDICARE

## 2022-09-21 VITALS
HEIGHT: 69 IN | DIASTOLIC BLOOD PRESSURE: 77 MMHG | TEMPERATURE: 97.1 F | WEIGHT: 245 LBS | HEART RATE: 72 BPM | SYSTOLIC BLOOD PRESSURE: 163 MMHG | BODY MASS INDEX: 36.29 KG/M2

## 2022-09-21 DIAGNOSIS — Z79.4 TYPE 2 DIABETES MELLITUS WITH CHRONIC KIDNEY DISEASE, WITH LONG-TERM CURRENT USE OF INSULIN, UNSPECIFIED CKD STAGE (HCC): Primary | ICD-10-CM

## 2022-09-21 DIAGNOSIS — L60.3 ONYCHODYSTROPHY: ICD-10-CM

## 2022-09-21 DIAGNOSIS — E11.22 TYPE 2 DIABETES MELLITUS WITH CHRONIC KIDNEY DISEASE, WITH LONG-TERM CURRENT USE OF INSULIN, UNSPECIFIED CKD STAGE (HCC): Primary | ICD-10-CM

## 2022-09-21 PROCEDURE — G8754 DIAS BP LESS 90: HCPCS | Performed by: PODIATRIST

## 2022-09-21 PROCEDURE — G8427 DOCREV CUR MEDS BY ELIG CLIN: HCPCS | Performed by: PODIATRIST

## 2022-09-21 PROCEDURE — G8432 DEP SCR NOT DOC, RNG: HCPCS | Performed by: PODIATRIST

## 2022-09-21 PROCEDURE — G8536 NO DOC ELDER MAL SCRN: HCPCS | Performed by: PODIATRIST

## 2022-09-21 PROCEDURE — 99213 OFFICE O/P EST LOW 20 MIN: CPT | Performed by: PODIATRIST

## 2022-09-21 PROCEDURE — 1124F ACP DISCUSS-NO DSCNMKR DOCD: CPT | Performed by: PODIATRIST

## 2022-09-21 PROCEDURE — 3044F HG A1C LEVEL LT 7.0%: CPT | Performed by: PODIATRIST

## 2022-09-21 PROCEDURE — 1101F PT FALLS ASSESS-DOCD LE1/YR: CPT | Performed by: PODIATRIST

## 2022-09-21 PROCEDURE — G8753 SYS BP > OR = 140: HCPCS | Performed by: PODIATRIST

## 2022-09-21 PROCEDURE — G8417 CALC BMI ABV UP PARAM F/U: HCPCS | Performed by: PODIATRIST

## 2022-09-21 NOTE — PROGRESS NOTES
Chief Complaint   Patient presents with    Diabetic Foot Exam     1. \"Have you been to the ER, urgent care clinic since your last visit? Hospitalized since your last visit? \" No    2. \"Have you seen or consulted any other health care providers outside of the [address] [place] [address] since your last visit? \" No     3. For patients aged 39-70: Has the patient had a colonoscopy / FIT/ Cologuard? NA - based on age      If the patient is female:    4. For patients aged 41-77: Has the patient had a mammogram within the past 2 years? NA - based on age or sex      11. For patients aged 21-65: Has the patient had a pap smear?  NA - based on age or sex

## 2022-09-26 ENCOUNTER — TRANSCRIBE ORDER (OUTPATIENT)
Dept: SCHEDULING | Age: 87
End: 2022-09-26

## 2022-09-26 DIAGNOSIS — I10 HTN (HYPERTENSION): ICD-10-CM

## 2022-09-26 DIAGNOSIS — R07.9 CHEST PAIN: Primary | ICD-10-CM

## 2022-09-26 DIAGNOSIS — R06.02 SHORTNESS OF BREATH: ICD-10-CM

## 2022-09-26 DIAGNOSIS — E78.5 HLD (HYPERLIPIDEMIA): ICD-10-CM

## 2022-09-26 DIAGNOSIS — E11.9 T2DM (TYPE 2 DIABETES MELLITUS) (HCC): ICD-10-CM

## 2022-09-26 DIAGNOSIS — I25.10 CAD (CORONARY ARTERY DISEASE): ICD-10-CM

## 2022-10-14 RX ORDER — INSULIN ASPART 100 [IU]/ML
10 INJECTION, SOLUTION INTRAVENOUS; SUBCUTANEOUS
Qty: 9 ML | Refills: 5 | Status: SHIPPED | OUTPATIENT
Start: 2022-10-14

## 2022-10-14 NOTE — TELEPHONE ENCOUNTER
[address] is calling to say the insurance will pay for Liliana Log or Fiast please send in a new prescription to [facility] in [facility]
I sent Novolog 10 units TID with meals to patient's pharmacy in place of Humalog due to formulary change.
Last OV:  5/24/22  Next OV: 11/1/22  Last Refill: no documentation from us for Januvia  Last a1c: 5/25/22    Will send to provider for review/auth refills for Januvia. Called pharmacy to get clarification on the Humalog and they stated that insurance is no longer covering it. Instead they are preferring the following:    - Novolog flex pen  - Fiasp flex touch  - Novolog    Pt states he only has about 2 days left of the Humalog.
Notified pt of insulin change
Patient also needs Novolog Insulin refill because his insurance is saying that the Humulog is not on their formulary per the [facility] that he uses
Patient needs a refill for  Januvia 50 mg tabs
Pt called and notified him of provider message. Pt questioned the sudden change of taking away the insulin, and so I reiterated the message that due to his A1c values and kidney function, the provider thinks he would do well with the januvia 50mg every day. Pt verbalized understanding and stated he will be checking his blood sugar 4x day to monitor his blood sugar levels after making the change.
Since provider is out of office today, will send to provider in office and see if they can address
With patient's kidney function the way it is, he should only be on Januvia 50mg once daily. His A1c is very well controlled, so I'm going to send the prescription for just once daily. Please let him know.
[facility]  Gastroenterology  [address] [place] [address] [address]  [place] [address]  Phone: [phone]  Fax:     [facility]  Neurology  [address] [place] [id number]  Phone: [phone]  Fax:   Follow Up Time:

## 2022-10-20 ENCOUNTER — TELEPHONE (OUTPATIENT)
Dept: FAMILY MEDICINE CLINIC | Age: 87
End: 2022-10-20

## 2022-10-20 NOTE — TELEPHONE ENCOUNTER
Patient came into the office today to say that the insulin medication he is on is giving him chest pains. He said he went to the cardiologist and they did an EKG and told him he was not having a heart attack . The insulin medication is Novolog and he said when he was on Humalog none of this was happening. Please call patient and advise [phone]. Patient also stated that he did not take his insulin this morning and he has no chest pains .

## 2022-10-21 ENCOUNTER — OFFICE VISIT (OUTPATIENT)
Dept: FAMILY MEDICINE CLINIC | Age: 87
End: 2022-10-21
Payer: MEDICARE

## 2022-10-21 VITALS
DIASTOLIC BLOOD PRESSURE: 71 MMHG | BODY MASS INDEX: 36.18 KG/M2 | SYSTOLIC BLOOD PRESSURE: 168 MMHG | HEIGHT: 69 IN | OXYGEN SATURATION: 100 % | HEART RATE: 76 BPM | TEMPERATURE: 97.5 F

## 2022-10-21 DIAGNOSIS — Z79.4 TYPE 2 DIABETES MELLITUS WITH CHRONIC KIDNEY DISEASE, WITH LONG-TERM CURRENT USE OF INSULIN, UNSPECIFIED CKD STAGE (HCC): Primary | ICD-10-CM

## 2022-10-21 DIAGNOSIS — E11.22 TYPE 2 DIABETES MELLITUS WITH CHRONIC KIDNEY DISEASE, WITH LONG-TERM CURRENT USE OF INSULIN, UNSPECIFIED CKD STAGE (HCC): Primary | ICD-10-CM

## 2022-10-21 PROCEDURE — 1101F PT FALLS ASSESS-DOCD LE1/YR: CPT | Performed by: NURSE PRACTITIONER

## 2022-10-21 PROCEDURE — G8432 DEP SCR NOT DOC, RNG: HCPCS | Performed by: NURSE PRACTITIONER

## 2022-10-21 PROCEDURE — G8753 SYS BP > OR = 140: HCPCS | Performed by: NURSE PRACTITIONER

## 2022-10-21 PROCEDURE — 3078F DIAST BP <80 MM HG: CPT | Performed by: NURSE PRACTITIONER

## 2022-10-21 PROCEDURE — 90694 VACC AIIV4 NO PRSRV 0.5ML IM: CPT | Performed by: NURSE PRACTITIONER

## 2022-10-21 PROCEDURE — G0008 ADMIN INFLUENZA VIRUS VAC: HCPCS | Performed by: NURSE PRACTITIONER

## 2022-10-21 PROCEDURE — 99214 OFFICE O/P EST MOD 30 MIN: CPT | Performed by: NURSE PRACTITIONER

## 2022-10-21 PROCEDURE — G8417 CALC BMI ABV UP PARAM F/U: HCPCS | Performed by: NURSE PRACTITIONER

## 2022-10-21 PROCEDURE — 3044F HG A1C LEVEL LT 7.0%: CPT | Performed by: NURSE PRACTITIONER

## 2022-10-21 PROCEDURE — 3074F SYST BP LT 130 MM HG: CPT | Performed by: NURSE PRACTITIONER

## 2022-10-21 PROCEDURE — 1124F ACP DISCUSS-NO DSCNMKR DOCD: CPT | Performed by: NURSE PRACTITIONER

## 2022-10-21 PROCEDURE — G8427 DOCREV CUR MEDS BY ELIG CLIN: HCPCS | Performed by: NURSE PRACTITIONER

## 2022-10-21 PROCEDURE — G8754 DIAS BP LESS 90: HCPCS | Performed by: NURSE PRACTITIONER

## 2022-10-21 PROCEDURE — G8536 NO DOC ELDER MAL SCRN: HCPCS | Performed by: NURSE PRACTITIONER

## 2022-10-21 RX ORDER — BENZONATATE 200 MG/1
CAPSULE ORAL
COMMUNITY

## 2022-10-21 RX ORDER — CYCLOBENZAPRINE HCL 5 MG
TABLET ORAL
COMMUNITY

## 2022-10-21 RX ORDER — ASCORBIC ACID 500 MG
TABLET ORAL
COMMUNITY

## 2022-10-21 RX ORDER — CLOPIDOGREL BISULFATE 75 MG/1
TABLET ORAL
COMMUNITY

## 2022-10-21 RX ORDER — LANOLIN ALCOHOL/MO/W.PET/CERES
1 CREAM (GRAM) TOPICAL DAILY
COMMUNITY

## 2022-10-21 RX ORDER — DICLOFENAC SODIUM 16.05 MG/ML
SOLUTION TOPICAL
COMMUNITY

## 2022-10-21 RX ORDER — INSULIN LISPRO 100 [IU]/ML
INJECTION, SOLUTION INTRAVENOUS; SUBCUTANEOUS
COMMUNITY

## 2022-10-21 RX ORDER — AMOXICILLIN AND CLAVULANATE POTASSIUM 875; 125 MG/1; MG/1
TABLET, FILM COATED ORAL
COMMUNITY

## 2022-10-21 RX ORDER — DICLOFENAC SODIUM 30 MG/G
GEL TOPICAL
COMMUNITY

## 2022-10-21 RX ORDER — ALLOPURINOL 100 MG/1
1 TABLET ORAL DAILY PRN
COMMUNITY

## 2022-10-21 NOTE — PROGRESS NOTES
1. Have you been to the ER, urgent care clinic since your last visit? Hospitalized since your last visit? No- did go see his cardiologist and EKG was done for chest pain    2. Have you seen or consulted any other health care providers outside of the [address] [place] [address] since your last visit? Include any pap smears or colon screening.  No  Chief Complaint   Patient presents with    Medication Evaluation     Side effect of Novolog-chest pain     Visit Vitals  BP (!) 182/96 (BP 1 Location: Left upper arm, BP Patient Position: Sitting, BP Cuff Size: Adult)   Pulse 76   Temp 97.5 °F (36.4 °C) (Temporal)   Ht 5' 9\" (1.753 m)   SpO2 100%   BMI 36.18 kg/m²     Visit Vitals  BP (!) 168/71 (BP 1 Location: Right upper arm, BP Patient Position: Sitting)   Pulse 76   Temp 97.5 °F (36.4 °C) (Temporal)   Ht 5' 9\" (1.753 m)   SpO2 100%   BMI 36.18 kg/m²

## 2022-10-21 NOTE — PROGRESS NOTES
Odalis Negron (: 1935) is a 82 y.o. male, established patient, here for evaluation of the following chief complaint(s):  Medication Evaluation (Side effect of Novolog-chest pain)    ASSESSMENT/PLAN:  Below is the assessment and plan developed based on review of pertinent history, physical exam, labs, studies, and medications. Based on today's findings we will proceed as follows:     1. Type 2 diabetes mellitus with chronic kidney disease, with long-term current use of insulin, unspecified CKD stage (HCC)  -     INFLUENZA, FLUAD, (AGE 65 Y+), IM, PF, 0.5 ML  Stable now we will not resume NovoLog  Continue with Lantus only and will re-check at follow up  Educated on medication, uses, side effects, as well as signs and symptoms that may merit immediate medical attention. Patient is not to share medication and use only as indicated. Patient verbalized understanding and agreement. If symptoms dont improve or worsen call the clinic or go to urgent in the 2-3 days for further evaluation    No follow-ups on file. SUBJECTIVE/OBJECTIVE:  HPI    Patient is a 82 y.o. M here for concerns of allergic reaction to diabetes medication. Had an allergic reaction to novolog, went to cardiology, EKG was unremarkable, stopped novolog and no longer having issues. Injecting Lantu 40 U qhs and glucose levels in the low 100s. Review of Systems  All other systems reviewed and are negative. Visit Vitals  BP (!) 168/71 (BP 1 Location: Right upper arm, BP Patient Position: Sitting)   Pulse 76   Temp 97.5 °F (36.4 °C) (Temporal)   Ht 5' 9\" (1.753 m)   SpO2 100%   BMI 36.18 kg/m²       Physical Exam  Constitutional:  No acute distress  HEENT:  Head normocephalic and atraumatic. CV:  Regular rate and rhythm. No murmur. Respiratory:  Lungs clear to auscultation bilaterally  Abdomen:  Soft, non-tender. Skin:  Normal color. Warm and Dry  Extremities:  Non-tender. No pedal edema.    Back:  No tenderness  Neuro:  No gross motor deficits    On this date 10/21/2022 I have spent 31 minutes reviewing previous notes, test results and face to face with the patient discussing the diagnosis and importance of compliance with the treatment plan as well as documenting on the day of the visit. Aspects of this note may have been generated using voice recognition software. Despite editing, there may be some syntax errors. An electronic signature was used to authenticate this note.   -- Efren Sanford NP

## 2022-10-21 NOTE — TELEPHONE ENCOUNTER
Pt was recently switched from Humalog to Novolog due to insurance not covering the medication. Called and spoke w/ pt. He states that he took it for about 4-5 days and stopped it. Has been checking his BS and they are high.  Scheduled same day appt w/ provider today at [facility] Will send to provider as an [place]

## 2022-10-21 NOTE — PROGRESS NOTES
[facility]    Subjective:         Patient is a 82 y.o. male who is being seen as a returning pt for diabetic pedal exam.  Patient states he has close follow-up with his PCP Carol Martínez NP). He states his last office visit with his PCP was 05/24/2022. He describes his diabetes as being under control, noting his last hemoglobin A1c was 5.6%. He denies any overt pedal pain. He denies any recent pedal trauma. He denies any systemic signs of infection but does state his nails are thick/discolored. He denies any other lower extremity complaints      Past Medical History:   Diagnosis Date    Chronic kidney disease     Diabetes ([facility] [address]     Hypercholesterolemia     Hypertension      Past Surgical History:   Procedure Laterality Date    COLONOSCOPY N/A 5/9/2022    COLONOSCOPY performed by Scott Solano MD at [address] [place] [address] ENDOSCOPY    HX CHOLECYSTECTOMY  2010    HX COLONOSCOPY  2013    HX UROLOGICAL  09/24/2010    CYSTO       Family History   Problem Relation Age of Onset    Heart Disease Mother     Diabetes Mother     Other Father         anerysm     Diabetes Father       Social History     Tobacco Use    Smoking status: Former     Packs/day: 0.50     Years: 22.00     Pack years: 11.00     Types: Cigarettes    Smokeless tobacco: Never   Substance Use Topics    Alcohol use: Not Currently     Allergies   Allergen Reactions    Novolog [Insulin Aspart] Palpitations and Other (comments)     Chest pain     Prior to Admission medications    Medication Sig Start Date End Date Taking? Authorizing Provider   insulin lispro (HUMALOG) 100 unit/mL injection Humalog U-100 Insulin 100 unit/mL subcutaneous solution   INJECT 10 UNITS WITH BREAKFAST AND LUNCH AND 15 UNITS WITH DINNER    Provider, Historical   ferrous sulfate 325 mg (65 mg iron) tablet Take 1 Tablet by mouth daily.   Patient not taking: Reported on 10/21/2022    Provider, Historical   diclofenac (SOLARAZE) 3 % topical gel diclofenac 3 % topical gel  Patient not taking: Reported on 10/21/2022    Provider, Historical   diclofenac sodium 1.5 % drop diclofenac 1.5 % topical drops   Apply 40 drops (1.2ml) 4 times daily to each affected knee. Patient not taking: Reported on 10/21/2022    Provider, Historical   cyclobenzaprine (FLEXERIL) 5 mg tablet cyclobenzaprine 5 mg tablet   Take 1 tablet every day by oral route at bedtime. Patient not taking: Reported on 10/21/2022    Provider, Historical   clopidogreL (PLAVIX) 75 mg tab clopidogrel 75 mg tablet  Patient not taking: Reported on 10/21/2022    Provider, Historical   benzonatate (TESSALON) 200 mg capsule benzonatate 200 mg capsule   Take 1 capsule 3 times a day by oral route as needed. Patient not taking: Reported on 10/21/2022    Provider, Historical   ascorbic acid, vitamin C, (VITAMIN C) 500 mg tablet Vitamin C 500 mg tablet   Take 1 tablet every day by oral route. Provider, Historical   amoxicillin-clavulanate (AUGMENTIN) 875-125 mg per tablet amoxicillin 875 mg-potassium clavulanate 125 mg tablet  Patient not taking: Reported on 10/21/2022    Provider, Historical   allopurinoL (ZYLOPRIM) 100 mg tablet Take 1 Tablet by mouth daily as needed. Patient not taking: Reported on 10/21/2022    Provider, Historical   SITagliptin (JANUVIA) 50 mg tablet Take 1 Tablet by mouth daily. 10/14/22   Ele Vaca DO   insulin aspart U-100 (NOVOLOG) 100 unit/mL (3 mL) inpn 10 Units by SubCUTAneous route three (3) times daily (with meals). Patient not taking: Reported on 10/21/2022 10/14/22   Wade Wen,    tamsulosin (Flomax) 0.4 mg capsule Take 1 Capsule by mouth daily. 8/26/22   Soniya Tan MD   finasteride (PROSCAR) 5 mg tablet Take 1 Tablet by mouth daily.   Patient not taking: Reported on 10/21/2022 8/26/22   Soniya Tan MD   Lantus Solostar U-100 Insulin 100 unit/mL (3 mL) inpn INJECT 40 UNITS SUBCUTANEOUSLY NIGHTLY 2/58/88   Jo Tang NP   ezetimibe (ZETIA) 10 mg tablet Take 10 mg by mouth daily. Patient not taking: Reported on 10/21/2022    Provider, Historical   insulin detemir U-100 (LEVEMIR) 100 unit/mL injection 14 Units by SubCUTAneous route daily. In AM  Patient not taking: Reported on 10/21/2022    Provider, Historical   insulin detemir U-100 (LEVEMIR) 100 unit/mL injection 26 Units by SubCUTAneous route nightly. Patient not taking: Reported on 10/21/2022    Provider, Historical   furosemide (LASIX) 40 mg tablet Take 1 Tablet by mouth daily. Indications: visible water retention 2/37/90   Higinio Felder NP   pravastatin (PRAVACHOL) 40 mg tablet Take 1 Tablet by mouth nightly. Indications: excessive fat in the blood 12/6/21   Shannanya Dumont, DO   amLODIPine-valsartan [facility])  mg per tablet Take 1 Tablet by mouth daily. Indications: high blood pressure 90/00/84   Mariposa Barney NP   naproxen (NAPROSYN) 500 mg tablet Take 1 tablet by mouth twice daily as needed 5/07/11   Higinio Felder NP   aspirin delayed-release 81 mg tablet Take  by mouth daily. Provider, Historical   timolol (TIMOPTIC) 0.5 % ophthalmic solution 1 Drop two (2) times a day. Provider, Historical   cholecalciferol, vitamin D3, (VITAMIN D3 PO) Take  by mouth. Provider, Historical       Review of Systems   Constitutional: Negative. HENT: Negative. Eyes: Negative. Respiratory: Negative. Cardiovascular:  Positive for leg swelling. Gastrointestinal: Negative. Endocrine: Negative. Genitourinary:  Positive for frequency. Musculoskeletal:  Positive for arthralgias. Skin: Negative. Allergic/Immunologic: Positive for immunocompromised state. Neurological:  Positive for numbness. Hematological: Negative. Psychiatric/Behavioral: Negative. All other systems reviewed and are negative.     Objective:     Visit Vitals  BP (!) 163/77 (BP 1 Location: Left upper arm, BP Patient Position: Sitting, BP Cuff Size: Large adult)   Pulse 72   Temp 97.1 °F (36.2 °C) (Temporal)   Ht 5' 9\" (1.753 m)   Wt 245 lb (111.1 kg)   BMI 36.18 kg/m²       Physical Exam  Vitals reviewed. Constitutional:       Appearance: He is morbidly obese. Cardiovascular:      Pulses:           Dorsalis pedis pulses are 2+ on the right side and 2+ on the left side. Posterior tibial pulses are 2+ on the right side and 2+ on the left side. Pulmonary:      Effort: Pulmonary effort is normal.   Musculoskeletal:      Right lower leg: Edema present. Left lower leg: Edema present. Right foot: Normal range of motion. No deformity or bunion. Left foot: Normal range of motion. No deformity or bunion. Feet:      Right foot:      Protective Sensation: 10 sites tested. 10 sites sensed. Skin integrity: Skin integrity normal.      Toenail Condition: Right toenails are abnormally thick. Left foot:      Protective Sensation: 10 sites tested. 10 sites sensed. Skin integrity: Skin integrity normal.      Toenail Condition: Left toenails are abnormally thick. Lymphadenopathy:      Lower Body: No right inguinal adenopathy. No left inguinal adenopathy. Skin:     General: Skin is warm. Capillary Refill: Capillary refill takes 2 to 3 seconds. Comments: Absent pedal hair growth with hyperpigmentation noted to the skin   Neurological:      Mental Status: He is alert and oriented to person, place, and time. Comments: Paresthesias noted to bilateral lower extremities   Psychiatric:         Mood and Affect: Mood and affect normal.         Behavior: Behavior is cooperative. Data Review: No results found for this or any previous visit (from the past 24 hour(s)). Impression:       ICD-10-CM ICD-9-CM    1. Type 2 diabetes mellitus with chronic kidney disease, with long-term current use of insulin, unspecified CKD stage ([email or web address]  E11.22 250.40     Z79.4 585. 9      V58.67       2.  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 Instructed patient to monitor their feet daily for possible wound formation, be compliant with all medications and wear supportive shoe gear for wound prevention. Reviewed and discussed most recent lab work, specifically as it pertains to his diabetes. Pt verbalized understanding of all discussed and reviewed          Jose G Child, [facility], [address] [place] [address] and [facility]  [address] [place] [address]  O: [phone]  F: [phone]  C: [phone]

## 2022-11-01 ENCOUNTER — HOSPITAL ENCOUNTER (OUTPATIENT)
Dept: NON INVASIVE DIAGNOSTICS | Age: 87
Discharge: HOME | End: 2022-11-01
Attending: INTERNAL MEDICINE
Payer: MEDICARE

## 2022-11-01 ENCOUNTER — HOSPITAL ENCOUNTER (OUTPATIENT)
Dept: NUCLEAR MEDICINE | Age: 87
Discharge: HOME | End: 2022-11-01
Attending: INTERNAL MEDICINE
Payer: MEDICARE

## 2022-11-01 VITALS
SYSTOLIC BLOOD PRESSURE: 173 MMHG | WEIGHT: 245 LBS | BODY MASS INDEX: 39.37 KG/M2 | DIASTOLIC BLOOD PRESSURE: 83 MMHG | HEIGHT: 66 IN

## 2022-11-01 DIAGNOSIS — R06.02 SHORTNESS OF BREATH: ICD-10-CM

## 2022-11-01 DIAGNOSIS — I25.10 CAD (CORONARY ARTERY DISEASE): ICD-10-CM

## 2022-11-01 DIAGNOSIS — E11.9 T2DM (TYPE 2 DIABETES MELLITUS) (HCC): ICD-10-CM

## 2022-11-01 DIAGNOSIS — E78.5 HLD (HYPERLIPIDEMIA): ICD-10-CM

## 2022-11-01 DIAGNOSIS — I10 HTN (HYPERTENSION): ICD-10-CM

## 2022-11-01 DIAGNOSIS — R07.9 CHEST PAIN: ICD-10-CM

## 2022-11-01 PROCEDURE — 74011250636 HC RX REV CODE- 250/636

## 2022-11-01 PROCEDURE — 93017 CV STRESS TEST TRACING ONLY: CPT

## 2022-11-01 RX ORDER — TETRAKIS(2-METHOXYISOBUTYLISOCYANIDE)COPPER(I) TETRAFLUOROBORATE 1 MG/ML
32.3 INJECTION, POWDER, LYOPHILIZED, FOR SOLUTION INTRAVENOUS
Status: COMPLETED | OUTPATIENT
Start: 2022-11-01 | End: 2022-11-01

## 2022-11-01 RX ADMIN — TETRAKIS(2-METHOXYISOBUTYLISOCYANIDE)COPPER(I) TETRAFLUOROBORATE 32.3 MILLICURIE: 1 INJECTION, POWDER, LYOPHILIZED, FOR SOLUTION INTRAVENOUS at 08:55

## 2022-11-01 RX ADMIN — REGADENOSON 0.4 MG: 0.08 INJECTION, SOLUTION INTRAVENOUS at 08:51

## 2022-11-02 ENCOUNTER — TELEPHONE (OUTPATIENT)
Dept: FAMILY MEDICINE CLINIC | Age: 87
End: 2022-11-02

## 2022-11-02 LAB
NUC STRESS EJECTION FRACTION: 48 %
STRESS BASELINE DIAS BP: 83 MMHG
STRESS BASELINE HR: 66 BPM
STRESS BASELINE ST DEPRESSION: 0 MM
STRESS BASELINE SYS BP: 173 MMHG
STRESS PERCENT HR ACHIEVED: 56 %
STRESS POST PEAK HR: 75 BPM
STRESS ST DEPRESSION: 0 MM
STRESS STAGE 1 DURATION: NORMAL MIN:SEC
STRESS STAGE 1 HR: 73 BPM
STRESS STAGE 2 BP: NORMAL MMHG
STRESS STAGE 2 DURATION: NORMAL MIN:SEC
STRESS STAGE 2 HR: 73 BPM
STRESS STAGE 3 DURATION: NORMAL MIN:SEC
STRESS STAGE 3 HR: 70 BPM
STRESS STAGE 4 BP: NORMAL MMHG
STRESS STAGE 4 DURATION: NORMAL MIN:SEC
STRESS STAGE 4 HR: 71 BPM
STRESS STAGE 5 BP: NORMAL MMHG
STRESS STAGE 5 DURATION: NORMAL MIN:SEC
STRESS STAGE 5 HR: 70 BPM
STRESS TARGET HR: 135 BPM

## 2022-11-02 NOTE — TELEPHONE ENCOUNTER
Called patient and left message for patient to call office. Patient left message wanting to schedule an awv appointment.

## 2022-11-02 NOTE — TELEPHONE ENCOUNTER
----- Message from Wander Alexander sent at 10/31/2022 10:06 AM EDT -----  Subject: Appointment Request    Reason for Call: Established Patient Appointment needed: Routine Medicare   AWV    QUESTIONS    Reason for appointment request? No appointments available during search     Additional Information for Provider? pt needs call back to reschedule AWV   appointment   ---------------------------------------------------------------------------  --------------  [address] [employer]  [phone]; OK to leave message on voicemail  ---------------------------------------------------------------------------  --------------  SCRIPT ANSWERS  COVID Screen: Arpan Butler

## 2022-11-12 DIAGNOSIS — I10 ESSENTIAL HYPERTENSION: ICD-10-CM

## 2022-11-15 RX ORDER — AMLODIPINE AND VALSARTAN 10; 320 MG/1; MG/1
TABLET ORAL
Qty: 90 TABLET | Refills: 0 | Status: SHIPPED | OUTPATIENT
Start: 2022-11-15

## 2022-11-21 DIAGNOSIS — Z79.4 TYPE 2 DIABETES MELLITUS WITH CHRONIC KIDNEY DISEASE, WITH LONG-TERM CURRENT USE OF INSULIN, UNSPECIFIED CKD STAGE (HCC): ICD-10-CM

## 2022-11-21 DIAGNOSIS — E11.22 TYPE 2 DIABETES MELLITUS WITH CHRONIC KIDNEY DISEASE, WITH LONG-TERM CURRENT USE OF INSULIN, UNSPECIFIED CKD STAGE (HCC): ICD-10-CM

## 2022-11-22 NOTE — TELEPHONE ENCOUNTER
Requested Prescriptions     Pending Prescriptions Disp Refills    Lantus Solostar U-100 Insulin 100 unit/mL (3 mL) inpn [Pharmacy Med Name: Lantus SoloStar 100 UNIT/ML Subcutaneous Solution Pen-injector] 15 mL 0     Sig: INJECT 40 UNITS SUBCUTANEOUSLY NIGHTLY        Last OV:10/21/22  Next OV:1/24/23  Last Refill:8/17/22  Qty 15 ml  Refills 0

## 2022-11-23 RX ORDER — INSULIN GLARGINE 100 [IU]/ML
INJECTION, SOLUTION SUBCUTANEOUS
Qty: 15 ML | Refills: 0 | Status: SHIPPED | OUTPATIENT
Start: 2022-11-23

## 2023-01-20 DIAGNOSIS — Z79.4 TYPE 2 DIABETES MELLITUS WITH CHRONIC KIDNEY DISEASE, WITH LONG-TERM CURRENT USE OF INSULIN, UNSPECIFIED CKD STAGE (HCC): ICD-10-CM

## 2023-01-20 DIAGNOSIS — E11.22 TYPE 2 DIABETES MELLITUS WITH CHRONIC KIDNEY DISEASE, WITH LONG-TERM CURRENT USE OF INSULIN, UNSPECIFIED CKD STAGE (HCC): ICD-10-CM

## 2023-01-20 RX ORDER — INSULIN GLARGINE 100 [IU]/ML
INJECTION, SOLUTION SUBCUTANEOUS
Qty: 15 ML | Refills: 0 | Status: SHIPPED | OUTPATIENT
Start: 2023-01-20

## 2023-01-20 NOTE — TELEPHONE ENCOUNTER
Last OV:10/21/22  Next OV:01/24/2023  Last Refill:11/23/2022  Last Labs: 05/25/2022  Requested Prescriptions     Pending Prescriptions Disp Refills    Lantus Solostar U-100 Insulin 100 unit/mL (3 mL) inpn [Pharmacy Med Name: Lantus SoloStar 100 UNIT/ML Subcutaneous Solution Pen-injector] 15 mL 0     Sig: INJECT 40 UNITS SUBCUTANEOUSLY NIGHTLY

## 2023-01-24 ENCOUNTER — VIRTUAL VISIT (OUTPATIENT)
Dept: FAMILY MEDICINE CLINIC | Age: 88
End: 2023-01-24
Payer: MEDICARE

## 2023-01-24 DIAGNOSIS — I10 ESSENTIAL HYPERTENSION: ICD-10-CM

## 2023-01-24 DIAGNOSIS — N18.30 STAGE 3 CHRONIC KIDNEY DISEASE, UNSPECIFIED WHETHER STAGE 3A OR 3B CKD (HCC): ICD-10-CM

## 2023-01-24 DIAGNOSIS — E66.01 SEVERE OBESITY (BMI 35.0-39.9) WITH COMORBIDITY (HCC): ICD-10-CM

## 2023-01-24 DIAGNOSIS — Z79.4 TYPE 2 DIABETES MELLITUS WITH CHRONIC KIDNEY DISEASE, WITH LONG-TERM CURRENT USE OF INSULIN, UNSPECIFIED CKD STAGE (HCC): ICD-10-CM

## 2023-01-24 DIAGNOSIS — Z00.00 MEDICARE ANNUAL WELLNESS VISIT, SUBSEQUENT: Primary | ICD-10-CM

## 2023-01-24 DIAGNOSIS — E11.22 TYPE 2 DIABETES MELLITUS WITH CHRONIC KIDNEY DISEASE, WITH LONG-TERM CURRENT USE OF INSULIN, UNSPECIFIED CKD STAGE (HCC): ICD-10-CM

## 2023-01-24 RX ORDER — INSULIN GLARGINE 100 [IU]/ML
INJECTION, SOLUTION SUBCUTANEOUS
Qty: 30 ML | Refills: 5 | Status: SHIPPED | OUTPATIENT
Start: 2023-01-24

## 2023-01-24 RX ORDER — NAPROXEN SODIUM 220 MG
TABLET ORAL
COMMUNITY

## 2023-01-24 RX ORDER — BLOOD SUGAR DIAGNOSTIC
STRIP MISCELLANEOUS
COMMUNITY

## 2023-01-24 NOTE — PROGRESS NOTES
1. Have you been to the ER, urgent care clinic since your last visit? Hospitalized since your last visit? 2. Have you seen or consulted any other health care providers outside of the [address] [place] [address] since your last visit? Include any pap smears or colon screening. Chief Complaint   Patient presents with    Follow Up Chronic Condition    Diabetes    Hypertension    Cholesterol Problem    Medication Refill     There were no vitals taken for this visit.

## 2023-01-24 NOTE — PROGRESS NOTES
This is the Subsequent Medicare Annual Wellness Exam, performed 12 months or more after the Initial AWV or the last Subsequent AWV    I have reviewed the patient's medical history in detail and updated the computerized patient record. Lionel He who was evaluated through a synchronous (real-time) audio-video encounter, and/or the patient's healthcare decision maker, is aware that it is a billable service, which includes applicable co-pays, with coverage as determined by the patient's insurance carrier. Lionel He provided verbal consent to proceed and patient identification was verified. This visit was conducted pursuant to the emergency declaration under the [address] [place] [address] waiver authority and the [employer] Act. A caregiver was present when appropriate. Ability to conduct physical exam was limited. The patient was located at home in a state where the provider was licensed to provide care. Lionel He (: 1935) is a 82 y.o. male, established patient, here for evaluation of the following chief complaint(s):   Follow Up Chronic Condition, Diabetes, Hypertension, Cholesterol Problem, and Medication Refill (Lantus/)         SUBJECTIVE/OBJECTIVE:  HPI    Patient is a 82 y.o. M Patient presenting for hypertension followup, diabetes follow up, hyperlipidemia check. Patient reports blood pressures at home most frequently normal. Patient has symptoms of none of the following; no chest pain, shortness of breath, weakness, orthostatic hypotension, cough, myalgias, rash, headaches, weight gain, leg swelling, palpitations, slow heart rate, fatigue, depression. Patient reports good compliance with medications, no side effects from medications noted. Current treatments include diet modification, weight loss. Review of Systems   All other systems reviewed and are negative.     No flowsheet data found.    Physical Exam    [INSTRUCTIONS:  \"[x]\" Indicates a positive item  \"[]\" Indicates a negative item  -- DELETE ALL ITEMS NOT EXAMINED]    Constitutional: [x] Appears well-developed and well-nourished [x] No apparent distress      [] Abnormal -     Mental status: [x] Alert and awake  [x] Oriented to person/place/time [x] Able to follow commands    [] Abnormal -     Eyes:   EOM    [x]  Normal    [] Abnormal -   Sclera  [x]  Normal    [] Abnormal -          Discharge [x]  None visible   [] Abnormal -     HENT: [x] Normocephalic, atraumatic  [] Abnormal -   [x] Mouth/Throat: Mucous membranes are moist    External Ears [x] Normal  [] Abnormal -    Neck: [x] No visualized mass [] Abnormal -     Pulmonary/Chest: [x] Respiratory effort normal   [x] No visualized signs of difficulty breathing or respiratory distress        [] Abnormal -      Musculoskeletal:   [x] Normal gait with no signs of ataxia         [x] Normal range of motion of neck        [] Abnormal -     Neurological:        [x] No Facial Asymmetry (Cranial nerve 7 motor function) (limited exam due to video visit)          [x] No gaze palsy        [] Abnormal -          Skin:        [x] No significant exanthematous lesions or discoloration noted on facial skin         [] Abnormal -            Psychiatric:       [x] Normal Affect [] Abnormal -        [x] No Hallucinations    Other pertinent observable physical exam findings:-          Assessment/Plan   Education and counseling provided:  Are appropriate based on today's review and evaluation  Influenza Vaccine  Hepatitis B Vaccine  Prostate cancer screening tests (PSA, covered annually)  Colorectal cancer screening tests  Cardiovascular screening blood test  Screening for glaucoma  Diabetes screening test    1. Essential hypertension  -     CBC WITH AUTOMATED DIFF  -     METABOLIC PANEL, COMPREHENSIVE  -     LIPID PANEL  -     THYROID CASCADE PROFILE  -     MICROALBUMIN, UR, RAND W/ MICROALB/CREAT RATIO  2. Type 2 diabetes mellitus with chronic kidney disease, with long-term current use of insulin, unspecified CKD stage (HCC)  -     Lantus Solostar U-100 Insulin 100 unit/mL (3 mL) inpn; INJECT 40 UNITS SUBCUTANEOUSLY NIGHTLY, Normal, Disp-30 mL, R-5, RUBY  -     CBC WITH AUTOMATED DIFF  -     METABOLIC PANEL, COMPREHENSIVE  -     LIPID PANEL  -     THYROID CASCADE PROFILE  -     MICROALBUMIN, UR, RAND W/ MICROALB/CREAT RATIO  -     HEMOGLOBIN A1C WITH EAG  3. Severe obesity (BMI 35.0-39. 9) with comorbidity ([facility]  4. Stage 3 chronic kidney disease, unspecified whether stage 3a or 3b CKD ([facility]     Depression Risk Factor Screening:     3 most recent PHQ Screens 10/21/2022   Little interest or pleasure in doing things Not at all   Feeling down, depressed, irritable, or hopeless Not at all   Total Score PHQ 2 0       Alcohol & Drug Abuse Risk Screen    Do you average more than 1 drink per night or more than 7 drinks a week: No    In the past three months have you have had more than 4 drinks containing alcohol on one occasion: No          Functional Ability and Level of Safety:    Hearing: Hearing is good. Activities of Daily Living: The home contains: handrails and grab bars  Patient does total self care      Ambulation: with mild difficulty     Fall Risk:  Fall Risk Assessment, last 12 mths 10/21/2022   Able to walk? Yes   Fall in past 12 months? 0   Do you feel unsteady?  0   Are you worried about falling 0      Abuse Screen:  Patient is not abused       Cognitive Screening    Has your family/caregiver stated any concerns about your memory: no    Cognitive Screening: Normal - MMSE (Mini Mental Status Exam), Clock Drawing Test    Health Maintenance Due     Health Maintenance Due   Topic Date Due    Shingles Vaccine (1 of 2) Never done    COVID-19 Vaccine (4 - Booster for Jaelyn Class series) 10/28/2021    Medicare Yearly Exam  04/28/2022       Patient Care Team   Patient Care Team:  Jj Gutierrez NP as PCP - General (Nurse Practitioner)  Martha Richard NP as PCP - [facility] Empaneled Provider    History     Patient Active Problem List   Diagnosis Code    BPH (benign prostatic hyperplasia) N40.0    Increased frequency of urination R35.0    Nocturia R35.1    Chronic kidney disease, stage 3 ([facility] N18.30    Coronary arteriosclerosis I25.10    Type 2 diabetes mellitus with chronic kidney disease ([address] E11.22    Edema of lower extremity R60.0    Essential hypertension I10    Hyperlipidemia E78.5    Hypokalemia E87.6    Iron deficiency anemia D50.9    Memory impairment R41.3    Diabetes mellitus type 2, uncontrolled [id number]    Type 2 diabetes mellitus without complication ([facility] Y31.1    GI bleed K92.2     Past Medical History:   Diagnosis Date    Chronic kidney disease     Diabetes ([facility] [address] [facility]     Hypercholesterolemia     Hypertension       Past Surgical History:   Procedure Laterality Date    COLONOSCOPY N/A 5/9/2022    COLONOSCOPY performed by Priscilla Levin MD at [address] [place] [address] ENDOSCOPY    HX CHOLECYSTECTOMY  2010    HX COLONOSCOPY  2013    HX UROLOGICAL  09/24/2010    CYSTO     Current Outpatient Medications   Medication Sig Dispense Refill    glucose blood VI test strips (Accu-Chek Ghazal Plus test strp) strip Accu-Chek Ghazal Plus test strips   TEST FOUR TIMES DAILY      Insulin Syringe-Needle U-100 1/2 mL 30 gauge syrg insulin syringe U-100 with needle 1/2 mL 30 gauge   USE FOUR TIMES DAILYUse to inject insulin 4 times daily      Lantus Solostar U-100 Insulin 100 unit/mL (3 mL) inpn INJECT 40 UNITS SUBCUTANEOUSLY NIGHTLY 30 mL 5    amLODIPine-valsartan (EXFORGE)  mg per tablet TAKE 1 TABLET BY MOUTH ONCE DAILY FOR HIGH BLOOD PRESSURE 90 Tablet 0    insulin lispro (HUMALOG) 100 unit/mL injection Humalog U-100 Insulin 100 unit/mL subcutaneous solution   INJECT 10 UNITS WITH BREAKFAST AND LUNCH AND 15 UNITS WITH DINNER      ferrous sulfate 325 mg (65 mg iron) tablet Take 1 Tablet by mouth daily.       diclofenac sodium 1.5 % drop       benzonatate (TESSALON) 200 mg capsule       SITagliptin (JANUVIA) 50 mg tablet Take 1 Tablet by mouth daily. 90 Tablet 3    insulin aspart U-100 (NOVOLOG) 100 unit/mL (3 mL) inpn 10 Units by SubCUTAneous route three (3) times daily (with meals). 9 mL 5    tamsulosin (Flomax) 0.4 mg capsule Take 1 Capsule by mouth daily. 90 Capsule 3    insulin detemir U-100 (LEVEMIR) 100 unit/mL injection 14 Units by SubCUTAneous route daily. In AM      insulin detemir U-100 (LEVEMIR) 100 unit/mL injection 26 Units by SubCUTAneous route nightly. furosemide (LASIX) 40 mg tablet Take 1 Tablet by mouth daily. Indications: visible water retention 90 Tablet 1    naproxen (NAPROSYN) 500 mg tablet Take 1 tablet by mouth twice daily as needed 60 Tab 5    aspirin delayed-release 81 mg tablet Take  by mouth daily. timolol (TIMOPTIC) 0.5 % ophthalmic solution 1 Drop two (2) times a day. cholecalciferol, vitamin D3, (VITAMIN D3 PO) Take  by mouth.      pravastatin (PRAVACHOL) 40 mg tablet Take 1 tablet by mouth nightly 90 Tablet 0    diclofenac (SOLARAZE) 3 % topical gel diclofenac 3 % topical gel (Patient not taking: No sig reported)      cyclobenzaprine (FLEXERIL) 5 mg tablet cyclobenzaprine 5 mg tablet   Take 1 tablet every day by oral route at bedtime. (Patient not taking: No sig reported)      clopidogreL (PLAVIX) 75 mg tab clopidogrel 75 mg tablet (Patient not taking: No sig reported)      ascorbic acid, vitamin C, (VITAMIN C) 500 mg tablet Vitamin C 500 mg tablet   Take 1 tablet every day by oral route. (Patient not taking: Reported on 1/24/2023)      allopurinoL (ZYLOPRIM) 100 mg tablet Take 1 Tablet by mouth daily as needed. (Patient not taking: No sig reported)      finasteride (PROSCAR) 5 mg tablet Take 1 Tablet by mouth daily. (Patient not taking: No sig reported) 90 Tablet 3    ezetimibe (ZETIA) 10 mg tablet Take 10 mg by mouth daily.  (Patient not taking: No sig reported)       Allergies   Allergen Reactions    Novolog [Insulin Aspart] Palpitations and Other (comments)     Chest pain, patient states he still take med does not bother him        Family History   Problem Relation Age of Onset    Heart Disease Mother     Diabetes Mother     Other Father         anerysm     Diabetes Father      Social History     Tobacco Use    Smoking status: Former     Packs/day: 0.50     Years: 22.00     Pack years: 11.00     Types: Cigarettes    Smokeless tobacco: Never   Substance Use Topics    Alcohol use: Not Currently       [place] Staff, was evaluated through a synchronous (real-time) audio-video encounter. The patient (or guardian if applicable) is aware that this is a billable service, which includes applicable co-pays. This Virtual Visit was conducted with patient's (and/or legal guardian's) consent. The visit was conducted pursuant to the emergency declaration under the [address] [place] [address] authority and the [employer] and [employer] General Act. Patient identification was verified, and a caregiver was present when appropriate. The patient was located at: Home: [address] [place], [place] [id number]  The provider was located at:  Facility (Appt Department): [address] [place] [address]       Zandra Dimas NP

## 2023-01-27 DIAGNOSIS — E78.5 HYPERLIPIDEMIA, UNSPECIFIED HYPERLIPIDEMIA TYPE: ICD-10-CM

## 2023-01-27 RX ORDER — PRAVASTATIN SODIUM 40 MG/1
TABLET ORAL
Qty: 90 TABLET | Refills: 0 | Status: SHIPPED | OUTPATIENT
Start: 2023-01-27

## 2023-02-02 DIAGNOSIS — I10 ESSENTIAL HYPERTENSION: ICD-10-CM

## 2023-02-02 NOTE — TELEPHONE ENCOUNTER
Last OV:01/24/2023  Next OV:none  Last Refill:01/25/2022  Last (labs):05/25/2022    Requested Prescriptions     Pending Prescriptions Disp Refills    furosemide (LASIX) 40 mg tablet 90 Tablet 1     Sig: Take 1 Tablet by mouth daily.  Indications: visible water retention

## 2023-02-06 RX ORDER — FUROSEMIDE 40 MG/1
40 TABLET ORAL DAILY
Qty: 90 TABLET | Refills: 1 | Status: SHIPPED | OUTPATIENT
Start: 2023-02-06

## 2023-02-15 DIAGNOSIS — I10 ESSENTIAL HYPERTENSION: ICD-10-CM

## 2023-02-21 RX ORDER — AMLODIPINE AND VALSARTAN 10; 320 MG/1; MG/1
TABLET ORAL
Qty: 90 TABLET | Refills: 0 | Status: SHIPPED | OUTPATIENT
Start: 2023-02-21

## 2023-04-13 ENCOUNTER — OFFICE VISIT (OUTPATIENT)
Dept: PODIATRY | Age: 88
End: 2023-04-13

## 2023-04-13 VITALS
HEIGHT: 66 IN | DIASTOLIC BLOOD PRESSURE: 85 MMHG | WEIGHT: 245 LBS | SYSTOLIC BLOOD PRESSURE: 123 MMHG | RESPIRATION RATE: 16 BRPM | BODY MASS INDEX: 39.37 KG/M2 | HEART RATE: 65 BPM

## 2023-04-13 DIAGNOSIS — E11.22 TYPE 2 DIABETES MELLITUS WITH CHRONIC KIDNEY DISEASE, WITH LONG-TERM CURRENT USE OF INSULIN, UNSPECIFIED CKD STAGE (HCC): Primary | ICD-10-CM

## 2023-04-13 DIAGNOSIS — Z79.4 TYPE 2 DIABETES MELLITUS WITH CHRONIC KIDNEY DISEASE, WITH LONG-TERM CURRENT USE OF INSULIN, UNSPECIFIED CKD STAGE (HCC): Primary | ICD-10-CM

## 2023-04-18 ENCOUNTER — OFFICE VISIT (OUTPATIENT)
Dept: FAMILY MEDICINE CLINIC | Age: 88
End: 2023-04-18

## 2023-04-18 VITALS
WEIGHT: 235.4 LBS | DIASTOLIC BLOOD PRESSURE: 80 MMHG | HEIGHT: 66 IN | HEART RATE: 82 BPM | TEMPERATURE: 98.6 F | OXYGEN SATURATION: 98 % | BODY MASS INDEX: 37.83 KG/M2 | SYSTOLIC BLOOD PRESSURE: 140 MMHG

## 2023-04-18 DIAGNOSIS — Z79.4 TYPE 2 DIABETES MELLITUS WITH CHRONIC KIDNEY DISEASE, WITH LONG-TERM CURRENT USE OF INSULIN, UNSPECIFIED CKD STAGE (HCC): ICD-10-CM

## 2023-04-18 DIAGNOSIS — M1A.9XX0 CHRONIC GOUT WITHOUT TOPHUS, UNSPECIFIED CAUSE, UNSPECIFIED SITE: ICD-10-CM

## 2023-04-18 DIAGNOSIS — E11.22 TYPE 2 DIABETES MELLITUS WITH CHRONIC KIDNEY DISEASE, WITH LONG-TERM CURRENT USE OF INSULIN, UNSPECIFIED CKD STAGE (HCC): ICD-10-CM

## 2023-04-18 DIAGNOSIS — I10 ESSENTIAL HYPERTENSION: ICD-10-CM

## 2023-04-18 DIAGNOSIS — M54.50 ACUTE MIDLINE LOW BACK PAIN WITHOUT SCIATICA: Primary | ICD-10-CM

## 2023-04-18 DIAGNOSIS — E78.5 HYPERLIPIDEMIA, UNSPECIFIED HYPERLIPIDEMIA TYPE: ICD-10-CM

## 2023-04-18 RX ORDER — ACETAMINOPHEN AND CODEINE PHOSPHATE 300; 30 MG/1; MG/1
1 TABLET ORAL
Qty: 18 TABLET | Refills: 0 | Status: SHIPPED | OUTPATIENT
Start: 2023-04-18 | End: 2023-04-21

## 2023-04-18 RX ORDER — ALLOPURINOL 100 MG/1
100 TABLET ORAL 2 TIMES DAILY
Qty: 180 TABLET | Refills: 3 | Status: SHIPPED | OUTPATIENT
Start: 2023-04-18

## 2023-04-18 RX ORDER — FINASTERIDE 5 MG/1
5 TABLET, FILM COATED ORAL DAILY
COMMUNITY
Start: 2023-02-09

## 2023-04-18 RX ORDER — METHOCARBAMOL 500 MG/1
500 TABLET, FILM COATED ORAL 4 TIMES DAILY
Qty: 30 TABLET | Refills: 0 | Status: SHIPPED | OUTPATIENT
Start: 2023-04-18

## 2023-05-04 DIAGNOSIS — E78.5 HYPERLIPIDEMIA, UNSPECIFIED HYPERLIPIDEMIA TYPE: ICD-10-CM

## 2023-05-04 RX ORDER — PRAVASTATIN SODIUM 40 MG/1
TABLET ORAL
Qty: 90 TABLET | Refills: 0 | Status: SHIPPED | OUTPATIENT
Start: 2023-05-04

## 2023-05-15 RX ORDER — AMLODIPINE AND VALSARTAN 10; 320 MG/1; MG/1
TABLET ORAL
Qty: 90 TABLET | Refills: 0 | Status: SHIPPED | OUTPATIENT
Start: 2023-05-15

## 2023-05-15 NOTE — TELEPHONE ENCOUNTER
Requested Prescriptions     Pending Prescriptions Disp Refills    amLODIPine-valsartan (EXFORGE)  MG per tablet [Pharmacy Med Name: amLODIPine Besylate-Valsartan  MG Oral Tablet] 90 tablet 0     Sig: TAKE 1 TABLET BY MOUTH ONCE DAILY FOR HIGH BLOOD PRESSURE     Last OV:04/18/2023  Next OV:10/24/2023  Last Refill:02/21/2023  Last (labs):02/08/2023    -*Insert any notes here*

## 2023-05-21 RX ORDER — INSULIN LISPRO 100 [IU]/ML
INJECTION, SOLUTION INTRAVENOUS; SUBCUTANEOUS
COMMUNITY

## 2023-05-21 RX ORDER — FERROUS SULFATE 325(65) MG
325 TABLET ORAL DAILY
COMMUNITY

## 2023-05-21 RX ORDER — DICLOFENAC 16.05 MG/ML
SOLUTION TOPICAL
COMMUNITY

## 2023-05-21 RX ORDER — TIMOLOL MALEATE 5 MG/ML
1 SOLUTION/ DROPS OPHTHALMIC 2 TIMES DAILY
COMMUNITY

## 2023-05-21 RX ORDER — ASPIRIN 81 MG/1
TABLET ORAL DAILY
COMMUNITY

## 2023-05-21 RX ORDER — INSULIN GLARGINE 100 [IU]/ML
INJECTION, SOLUTION SUBCUTANEOUS
COMMUNITY
Start: 2023-01-24 | End: 2023-07-06 | Stop reason: SDUPTHER

## 2023-05-21 RX ORDER — FUROSEMIDE 40 MG/1
40 TABLET ORAL DAILY
COMMUNITY
Start: 2023-02-06 | End: 2023-07-18 | Stop reason: SDUPTHER

## 2023-05-21 RX ORDER — PRAVASTATIN SODIUM 40 MG
1 TABLET ORAL NIGHTLY
COMMUNITY
Start: 2023-05-04 | End: 2023-06-19

## 2023-05-21 RX ORDER — FINASTERIDE 5 MG/1
5 TABLET, FILM COATED ORAL DAILY
COMMUNITY
Start: 2023-02-09

## 2023-05-21 RX ORDER — ALLOPURINOL 100 MG/1
100 TABLET ORAL 2 TIMES DAILY
COMMUNITY
Start: 2023-04-18

## 2023-05-21 RX ORDER — METHOCARBAMOL 500 MG/1
500 TABLET, FILM COATED ORAL 4 TIMES DAILY
COMMUNITY
Start: 2023-04-18

## 2023-05-21 RX ORDER — AMLODIPINE AND VALSARTAN 10; 320 MG/1; MG/1
TABLET ORAL
COMMUNITY
Start: 2023-02-21

## 2023-05-21 RX ORDER — TAMSULOSIN HYDROCHLORIDE 0.4 MG/1
0.4 CAPSULE ORAL DAILY
COMMUNITY
Start: 2022-08-26

## 2023-05-21 RX ORDER — NAPROXEN 500 MG/1
1 TABLET ORAL 2 TIMES DAILY PRN
COMMUNITY
Start: 2021-04-27

## 2023-06-19 RX ORDER — AMLODIPINE AND VALSARTAN 10; 320 MG/1; MG/1
TABLET ORAL
Qty: 90 TABLET | Refills: 0 | Status: SHIPPED | OUTPATIENT
Start: 2023-06-19

## 2023-06-19 RX ORDER — PRAVASTATIN SODIUM 40 MG
TABLET ORAL NIGHTLY
Qty: 90 TABLET | Refills: 0 | Status: SHIPPED | OUTPATIENT
Start: 2023-06-19

## 2023-06-19 NOTE — TELEPHONE ENCOUNTER
Requested Prescriptions     Pending Prescriptions Disp Refills    pravastatin (PRAVACHOL) 40 MG tablet [Pharmacy Med Name: Pravastatin Sodium 40 MG Oral Tablet] 90 tablet 0     Sig: Take 1 tablet by mouth nightly    amLODIPine-valsartan (EXFORGE)  MG per tablet [Pharmacy Med Name: amLODIPine Besylate-Valsartan  MG Oral Tablet] 90 tablet 0     Sig: TAKE 1 TABLET BY MOUTH ONCE DAILY FOR HIGH BLOOD PRESSURE        Last OV:4/18/23  Next OV:10/24/23  Last Refill:      Pravastatin 5/4/23  Qty 90  Refills 0      Amlodipine-valsartan 2/21/23  Qty 90  Refills 0

## 2023-07-03 ENCOUNTER — TELEPHONE (OUTPATIENT)
Facility: CLINIC | Age: 88
End: 2023-07-03

## 2023-07-03 DIAGNOSIS — E11.22 TYPE 2 DIABETES MELLITUS WITH CHRONIC KIDNEY DISEASE, WITH LONG-TERM CURRENT USE OF INSULIN, UNSPECIFIED CKD STAGE (HCC): Primary | ICD-10-CM

## 2023-07-03 DIAGNOSIS — Z79.4 TYPE 2 DIABETES MELLITUS WITH CHRONIC KIDNEY DISEASE, WITH LONG-TERM CURRENT USE OF INSULIN, UNSPECIFIED CKD STAGE (HCC): Primary | ICD-10-CM

## 2023-07-06 RX ORDER — INSULIN GLARGINE 100 [IU]/ML
INJECTION, SOLUTION SUBCUTANEOUS
Qty: 5 ADJUSTABLE DOSE PRE-FILLED PEN SYRINGE | Refills: 5 | Status: SHIPPED | OUTPATIENT
Start: 2023-07-06

## 2023-07-06 RX ORDER — INSULIN ASPART 100 [IU]/ML
INJECTION, SOLUTION INTRAVENOUS; SUBCUTANEOUS
Qty: 5 ADJUSTABLE DOSE PRE-FILLED PEN SYRINGE | Refills: 5 | Status: SHIPPED | OUTPATIENT
Start: 2023-07-06

## 2023-07-06 RX ORDER — INSULIN ASPART 100 [IU]/ML
INJECTION, SOLUTION INTRAVENOUS; SUBCUTANEOUS
COMMUNITY
Start: 2023-05-18 | End: 2023-07-06 | Stop reason: SDUPTHER

## 2023-07-06 NOTE — TELEPHONE ENCOUNTER
Requested Prescriptions     Pending Prescriptions Disp Refills    NOVOLOG FLEXPEN 100 UNIT/ML injection pen 5 Adjustable Dose Pre-filled Pen Syringe           Last OV:4/18/23  Next OV:10/24/23  Last Refill:10/14/22  Qty 9 mL  Refills 5        I called and spoke with the patient as I see an allergy to Novolog, Patient states that he no longer has any issues with it and has been taken it for 6 months.

## 2023-07-06 NOTE — TELEPHONE ENCOUNTER
Patient called and stated his pharmacy told him to call us to put in a refill for the below:    Novolog

## 2023-07-07 NOTE — TELEPHONE ENCOUNTER
His most recent a1c is 6.1,   we can stop the novolog for now   Start onjectin Lantus twice a day instead  Continue with Lantus 40 at bedtime  Start with lantus 20 in the morning  We will check again with blood draw 1 week prior to follow up
Message has been forwarded to provider for recommendations.
Pt called stating that his pharmacy ([facility] in [facility]) notified him that his insurance will not cover the Novolog. Please advise.
Spoke to patient informed him of providers recommendations, patient voiced understanding
Speaking Coherently

## 2023-07-17 DIAGNOSIS — E78.5 HYPERLIPIDEMIA, UNSPECIFIED HYPERLIPIDEMIA TYPE: ICD-10-CM

## 2023-07-17 DIAGNOSIS — E11.9 TYPE 2 DIABETES MELLITUS WITHOUT COMPLICATION, UNSPECIFIED WHETHER LONG TERM INSULIN USE (HCC): Primary | ICD-10-CM

## 2023-07-17 DIAGNOSIS — I25.10 CORONARY ARTERIOSCLEROSIS: ICD-10-CM

## 2023-07-17 NOTE — TELEPHONE ENCOUNTER
Requested Prescriptions     Pending Prescriptions Disp Refills    furosemide (LASIX) 40 MG tablet 90 tablet 0     Sig: Take 1 tablet by mouth daily    pravastatin (PRAVACHOL) 40 MG tablet 90 tablet 0     Sig: Take 1 tablet by mouth nightly    SITagliptin (JANUVIA) 50 MG tablet 90 tablet 0     Sig: Take 1 tablet by mouth daily     Last OV:04/18/2023  Next OV:10/24/2023  Last Refill:  Furosemide 02/06/2023  Pravastatin 06/19/2023  Sitagliptin 10/14/2022  Last (labs):02/08/2023    -*Insert any notes here*

## 2023-07-17 NOTE — TELEPHONE ENCOUNTER
Patient came into office and needs the below refills and send [facility].     Furosemide 40 mg tablet    Pravastatin 40 mg tablet    Januvia 50 mg tablet

## 2023-07-18 RX ORDER — PRAVASTATIN SODIUM 40 MG
40 TABLET ORAL NIGHTLY
Qty: 90 TABLET | Refills: 0 | Status: SHIPPED | OUTPATIENT
Start: 2023-07-18

## 2023-07-18 RX ORDER — FUROSEMIDE 40 MG/1
40 TABLET ORAL DAILY
Qty: 90 TABLET | Refills: 0 | Status: SHIPPED | OUTPATIENT
Start: 2023-07-18

## 2023-08-23 PROBLEM — R35.1 NOCTURIA: Status: ACTIVE | Noted: 2020-08-04

## 2023-08-23 PROBLEM — N40.0 BPH (BENIGN PROSTATIC HYPERPLASIA): Status: ACTIVE | Noted: 2020-08-04

## 2023-08-23 PROBLEM — R35.0 INCREASED FREQUENCY OF URINATION: Status: ACTIVE | Noted: 2020-08-04

## 2023-09-21 ENCOUNTER — TELEPHONE (OUTPATIENT)
Facility: CLINIC | Age: 88
End: 2023-09-21

## 2023-09-21 NOTE — TELEPHONE ENCOUNTER
Left message informing office we o not have results requested     It was ordered at their office and results should come back to you

## 2023-10-24 ENCOUNTER — OFFICE VISIT (OUTPATIENT)
Facility: CLINIC | Age: 88
End: 2023-10-24
Payer: COMMERCIAL

## 2023-10-24 VITALS
DIASTOLIC BLOOD PRESSURE: 62 MMHG | WEIGHT: 231 LBS | BODY MASS INDEX: 37.12 KG/M2 | SYSTOLIC BLOOD PRESSURE: 134 MMHG | OXYGEN SATURATION: 97 % | TEMPERATURE: 97.7 F | HEART RATE: 75 BPM | HEIGHT: 66 IN

## 2023-10-24 DIAGNOSIS — Z23 INFLUENZA VACCINE ADMINISTERED: ICD-10-CM

## 2023-10-24 DIAGNOSIS — E11.22 TYPE 2 DIABETES MELLITUS WITH CHRONIC KIDNEY DISEASE, WITH LONG-TERM CURRENT USE OF INSULIN, UNSPECIFIED CKD STAGE (HCC): Primary | ICD-10-CM

## 2023-10-24 DIAGNOSIS — E78.5 HYPERLIPIDEMIA, UNSPECIFIED HYPERLIPIDEMIA TYPE: ICD-10-CM

## 2023-10-24 DIAGNOSIS — Z79.4 TYPE 2 DIABETES MELLITUS WITH CHRONIC KIDNEY DISEASE, WITH LONG-TERM CURRENT USE OF INSULIN, UNSPECIFIED CKD STAGE (HCC): Primary | ICD-10-CM

## 2023-10-24 DIAGNOSIS — I10 ESSENTIAL (PRIMARY) HYPERTENSION: ICD-10-CM

## 2023-10-24 DIAGNOSIS — I50.22 CHRONIC SYSTOLIC (CONGESTIVE) HEART FAILURE (HCC): ICD-10-CM

## 2023-10-24 PROCEDURE — 90471 IMMUNIZATION ADMIN: CPT | Performed by: NURSE PRACTITIONER

## 2023-10-24 PROCEDURE — 90694 VACC AIIV4 NO PRSRV 0.5ML IM: CPT | Performed by: NURSE PRACTITIONER

## 2023-10-24 PROCEDURE — 3044F HG A1C LEVEL LT 7.0%: CPT | Performed by: NURSE PRACTITIONER

## 2023-10-24 PROCEDURE — 99214 OFFICE O/P EST MOD 30 MIN: CPT | Performed by: NURSE PRACTITIONER

## 2023-10-24 PROCEDURE — 1123F ACP DISCUSS/DSCN MKR DOCD: CPT | Performed by: NURSE PRACTITIONER

## 2023-10-24 RX ORDER — LANOLIN ALCOHOL/MO/W.PET/CERES
1000 CREAM (GRAM) TOPICAL DAILY
COMMUNITY

## 2023-10-24 RX ORDER — VITAMIN B COMPLEX
1 TABLET ORAL DAILY
COMMUNITY

## 2023-10-24 RX ORDER — ACYCLOVIR 400 MG/1
1 TABLET ORAL
Qty: 1 EACH | Refills: 0 | Status: SHIPPED | OUTPATIENT
Start: 2023-10-24

## 2023-10-24 RX ORDER — COLCHICINE 0.6 MG/1
0.6 TABLET ORAL DAILY PRN
COMMUNITY

## 2023-10-24 RX ORDER — DULAGLUTIDE 0.75 MG/.5ML
0.75 INJECTION, SOLUTION SUBCUTANEOUS WEEKLY
Qty: 2 ML | Refills: 2 | Status: SHIPPED | OUTPATIENT
Start: 2023-10-24

## 2023-10-24 RX ORDER — NIACIN 500 MG
1 TABLET ORAL
COMMUNITY

## 2023-10-24 RX ORDER — ACYCLOVIR 400 MG/1
1 TABLET ORAL
Qty: 6 EACH | Refills: 3 | Status: SHIPPED | OUTPATIENT
Start: 2023-10-24

## 2023-10-24 ASSESSMENT — PATIENT HEALTH QUESTIONNAIRE - PHQ9
1. LITTLE INTEREST OR PLEASURE IN DOING THINGS: 0
SUM OF ALL RESPONSES TO PHQ QUESTIONS 1-9: 0
2. FEELING DOWN, DEPRESSED OR HOPELESS: 0
SUM OF ALL RESPONSES TO PHQ QUESTIONS 1-9: 0
SUM OF ALL RESPONSES TO PHQ9 QUESTIONS 1 & 2: 0

## 2023-10-24 NOTE — PATIENT INSTRUCTIONS
Will stop novolog  Will start Trulicity 0.20 mg once a week on Wednesdays  Continue with Levemir 26 units at bedtime every night  Get Blood drawn 1 week prior to your follow up

## 2023-10-24 NOTE — PROGRESS NOTES
Subjective:   Reed Duarte is a 82 y.o. male  established here with complaints of Follow-up, Hypertension, Diabetes, and Discuss Labs  Patient presenting for Diabetes follow up. Has been having issues with getting NovoLog from the pharmacy. Continues injecting Levemir 26 units at bedtime but A1c continues to be elevated. Also wishing to get a Dexcom unit. Patient reports peripheral extremity tingling/pain. Patient denies polyuria, polydipsia, polyphagia, and nocturia. Patient reports  good compliance with medications, no side effects from medications noted, and good compliance with diabetic diet. Current treatments include diabetic diet. Patient reports blood glucose most frequently , 100-200, and 200-300 on home checks. Patient presenting for hypertension and hyperlipidemia followup. Was hospitalized for CHF, stable, last cardiology visit was about a week ago and follow-up in 1 month. Patient reports blood pressures at home most frequently between 120/80 and 130/88 . Patient reports shortness of breath and weight gain Patient denies chest pain, weakness, and orthostatic hypotension. Patient reports  good compliance with medications and no side effects from medications noted. Current treatments include diet modification, weight loss.      Patient Active Problem List   Diagnosis    BPH (benign prostatic hyperplasia)    Diabetes mellitus type 2, uncontrolled    Chronic kidney disease, stage 3 (HCC)    Edema of lower extremity    Memory impairment    Hypokalemia    Increased frequency of urination    Essential hypertension    Type 2 diabetes mellitus without complication (HCC)    Coronary arteriosclerosis    Type 2 diabetes mellitus with chronic kidney disease (HCC)    Hyperlipidemia    Iron deficiency anemia    GI bleed    Nocturia    Chronic systolic (congestive) heart failure      Current Outpatient Medications   Medication Sig Dispense Refill    niacin 500 MG tablet Take 1 tablet by mouth daily

## 2023-10-30 ENCOUNTER — TELEPHONE (OUTPATIENT)
Facility: CLINIC | Age: 88
End: 2023-10-30

## 2023-10-30 NOTE — TELEPHONE ENCOUNTER
Pharmacy sent a message stating that dexcom is changed every 10 days and not 14 days.  They will like for you to resend the script

## 2023-11-04 ENCOUNTER — APPOINTMENT (OUTPATIENT)
Facility: HOSPITAL | Age: 88
End: 2023-11-04
Payer: MEDICARE

## 2023-11-04 ENCOUNTER — HOSPITAL ENCOUNTER (EMERGENCY)
Facility: HOSPITAL | Age: 88
Discharge: HOME | End: 2023-11-04
Attending: EMERGENCY MEDICINE
Payer: MEDICARE

## 2023-11-04 VITALS
HEART RATE: 67 BPM | HEIGHT: 69 IN | WEIGHT: 240 LBS | OXYGEN SATURATION: 100 % | RESPIRATION RATE: 16 BRPM | SYSTOLIC BLOOD PRESSURE: 132 MMHG | DIASTOLIC BLOOD PRESSURE: 83 MMHG | BODY MASS INDEX: 35.55 KG/M2 | TEMPERATURE: 98.2 F

## 2023-11-04 DIAGNOSIS — M25.551 ACUTE PAIN OF RIGHT HIP: Primary | ICD-10-CM

## 2023-11-04 LAB
APPEARANCE UR: CLEAR
BACTERIA URNS QL MICRO: NEGATIVE /HPF
BILIRUB UR QL: NEGATIVE
COLOR UR: ABNORMAL
GLUCOSE UR STRIP.AUTO-MCNC: NEGATIVE MG/DL
HGB UR QL STRIP: NEGATIVE
HYALINE CASTS URNS QL MICRO: >20 /LPF (ref 0–5)
KETONES UR QL STRIP.AUTO: NEGATIVE MG/DL
LEUKOCYTE ESTERASE UR QL STRIP.AUTO: NEGATIVE
MUCOUS THREADS URNS QL MICRO: ABNORMAL /LPF
NITRITE UR QL STRIP.AUTO: NEGATIVE
PH UR STRIP: 5 (ref 5–8)
PROT UR STRIP-MCNC: NEGATIVE MG/DL
RBC #/AREA URNS HPF: ABNORMAL /HPF (ref 0–5)
SP GR UR REFRACTOMETRY: 1.01 (ref 1–1.03)
URINE CULTURE IF INDICATED: ABNORMAL
UROBILINOGEN UR QL STRIP.AUTO: 0.1 EU/DL (ref 0.1–1)
WBC URNS QL MICRO: ABNORMAL /HPF (ref 0–4)

## 2023-11-04 PROCEDURE — 81001 URINALYSIS AUTO W/SCOPE: CPT

## 2023-11-04 PROCEDURE — 6370000000 HC RX 637 (ALT 250 FOR IP): Performed by: EMERGENCY MEDICINE

## 2023-11-04 PROCEDURE — 73502 X-RAY EXAM HIP UNI 2-3 VIEWS: CPT

## 2023-11-04 PROCEDURE — 99284 EMERGENCY DEPT VISIT MOD MDM: CPT

## 2023-11-04 RX ORDER — PREDNISONE 20 MG/1
40 TABLET ORAL DAILY
Qty: 8 TABLET | Refills: 0 | Status: SHIPPED | OUTPATIENT
Start: 2023-11-04 | End: 2023-11-08

## 2023-11-04 RX ORDER — ACETAMINOPHEN 500 MG
1000 TABLET ORAL
Status: COMPLETED | OUTPATIENT
Start: 2023-11-04 | End: 2023-11-04

## 2023-11-04 RX ORDER — PREDNISONE 20 MG/1
40 TABLET ORAL DAILY
Status: COMPLETED | OUTPATIENT
Start: 2023-11-04 | End: 2023-11-04

## 2023-11-04 RX ADMIN — PREDNISONE 40 MG: 20 TABLET ORAL at 14:37

## 2023-11-04 RX ADMIN — ACETAMINOPHEN 1000 MG: 500 TABLET ORAL at 14:37

## 2023-11-04 ASSESSMENT — PAIN SCALES - GENERAL: PAINLEVEL_OUTOF10: 10

## 2023-11-04 ASSESSMENT — PAIN DESCRIPTION - LOCATION: LOCATION: HIP

## 2023-11-04 ASSESSMENT — LIFESTYLE VARIABLES
HOW OFTEN DO YOU HAVE A DRINK CONTAINING ALCOHOL: NEVER
HOW MANY STANDARD DRINKS CONTAINING ALCOHOL DO YOU HAVE ON A TYPICAL DAY: PATIENT DOES NOT DRINK

## 2023-11-04 ASSESSMENT — PAIN DESCRIPTION - ORIENTATION: ORIENTATION: RIGHT

## 2023-11-04 ASSESSMENT — PAIN - FUNCTIONAL ASSESSMENT: PAIN_FUNCTIONAL_ASSESSMENT: 0-10

## 2023-11-04 NOTE — DISCHARGE INSTRUCTIONS
Thank you! Thank you for allowing me to care for you in the emergency department. It is my goal to provide you with excellent care. If you have not received excellent quality care, please ask to speak to the nurse manager. Please fill out the survey that will come to you by mail or email since we listen to your feedback! Below you will find a list of your tests from today's visit. Should you have any questions, please do not hesitate to call the emergency department. Labs  Recent Results (from the past 12 hour(s))   Urinalysis with Reflex to Culture    Collection Time: 11/04/23 12:03 PM    Specimen: Urine   Result Value Ref Range    Color, UA Yellow/Straw      Appearance Clear Clear      Specific Gravity, UA 1.012 1.003 - 1.030      pH, Urine 5.0 5.0 - 8.0      Protein, UA Negative Negative mg/dL    Glucose, UA Negative Negative mg/dL    Ketones, Urine Negative Negative mg/dL    Bilirubin Urine Negative Negative      Blood, Urine Negative Negative      Urobilinogen, Urine 0.1 0.1 - 1.0 EU/dL    Nitrite, Urine Negative Negative      Leukocyte Esterase, Urine Negative Negative      BACTERIA, URINE Negative Negative /hpf    Urine Culture if Indicated Culture not indicated by UA result Culture not indicated by UA result      WBC, UA 0-4 0 - 4 /hpf    RBC, UA 0-5 0 - 5 /hpf    Hyaline Casts, UA >20 (H) 0 - 5 /lpf    Mucus, UA Trace /lpf       Radiologic Studies  XR HIP 2-3 VW W PELVIS RIGHT   Final Result   No acute abnormality.        ------------------------------------------------------------------------------------------------------------  The exam and treatment you received in the Emergency Department were for an urgent problem and are not intended as complete care. It is important that you follow-up with a doctor, nurse practitioner, or physician assistant to:  (1) confirm your diagnosis,  (2) re-evaluation of changes in your illness and treatment, and  (3) for ongoing care.  Please take your discharge

## 2023-11-04 NOTE — ED PROVIDER NOTES
[facility] EMERGENCY DEPT  EMERGENCY DEPARTMENT HISTORY AND PHYSICAL EXAM      Date: 11/4/2023  Patient Name: Edward Kanner  MRN: [id number]  [address] [facility] 1935  Date of evaluation: 11/4/2023  Provider: Ronnie Villela MD   Note Started: 12:39 PM EDT 11/4/23    HISTORY OF PRESENT ILLNESS     Chief Complaint   Patient presents with    Hip Pain       History Provided By: Patient    HPI: Edward Kanner is a 82 y.o. male past medical history of CKD, diabetes, hypertension hyperlipidemia presents for evaluation of right hip pain. Atraumatic. Started 4 days ago. Starts in the posterior right hip and radiates down the leg. Worse with lying flat and walking. Better with sitting. Has tried any medications. Cannot take NSAIDs due to CKD. PAST MEDICAL HISTORY   Past Medical History:  Past Medical History:   Diagnosis Date    Chronic kidney disease     Diabetes ([address])     Hypercholesterolemia     Hypertension        Past Surgical History:  Past Surgical History:   Procedure Laterality Date    CHOLECYSTECTOMY  2010    COLONOSCOPY  2013    COLONOSCOPY N/A 5/9/2022    COLONOSCOPY performed by Lashell Toth MD at [address] [place] [address]  09/24/2010    CYSTO       Family History:  Family History   Problem Relation Age of Onset    Heart Disease Mother     Diabetes Father     Other Father         anerysm     Diabetes Mother        Social History:  Social History     Tobacco Use    Smoking status: Former     Packs/day: .5     Types: Cigarettes    Smokeless tobacco: Never   Substance Use Topics    Alcohol use: Not Currently    Drug use: Not Currently       Allergies: Allergies   Allergen Reactions    Insulin Aspart Other (See Comments) and Palpitations     Chest pain, patient states he still take med does not bother him        PCP: XAVI Watson - CNP    Current Meds:   No current facility-administered medications for this encounter.      Current Outpatient Medications   Medication Sig Dispense Refill

## 2023-11-06 RX ORDER — FINASTERIDE 5 MG/1
5 TABLET, FILM COATED ORAL DAILY
Qty: 90 TABLET | Refills: 0 | Status: SHIPPED | OUTPATIENT
Start: 2023-11-06

## 2023-11-06 RX ORDER — SITAGLIPTIN 50 MG/1
50 TABLET, FILM COATED ORAL DAILY
Qty: 90 TABLET | Refills: 0 | Status: SHIPPED | OUTPATIENT
Start: 2023-11-06

## 2023-11-06 NOTE — TELEPHONE ENCOUNTER
Requested Prescriptions     Pending Prescriptions Disp Refills    JANUVIA 50 MG tablet [Pharmacy Med Name: Januvia 50 MG Oral Tablet] 90 tablet 0     Sig: Take 1 tablet by mouth once daily

## 2023-11-15 ENCOUNTER — TELEPHONE (OUTPATIENT)
Facility: CLINIC | Age: 88
End: 2023-11-15

## 2023-11-28 ENCOUNTER — TELEPHONE (OUTPATIENT)
Facility: CLINIC | Age: 88
End: 2023-11-28

## 2023-11-28 DIAGNOSIS — E78.5 HYPERLIPIDEMIA, UNSPECIFIED HYPERLIPIDEMIA TYPE: ICD-10-CM

## 2023-11-28 RX ORDER — LANCETS 23 GAUGE
EACH MISCELLANEOUS
COMMUNITY
End: 2023-12-07 | Stop reason: SDUPTHER

## 2023-11-28 NOTE — TELEPHONE ENCOUNTER
Requested Prescriptions     Pending Prescriptions Disp Refills    pravastatin (PRAVACHOL) 40 MG tablet 90 tablet 0     Sig: Take 1 tablet by mouth nightly    Lancets 28G MISC       Sig: by Does not apply route            Patient is requesting refill on lancets because he likes to check is glucose levels at night. I tried explain that's what the Dexcom is for.

## 2023-11-28 NOTE — TELEPHONE ENCOUNTER
Patient called and stated we cancelled his lancets per the pharmacy. Please have the clinical staff call patient at [phone] about his medication lancets.

## 2023-11-30 ENCOUNTER — TELEPHONE (OUTPATIENT)
Facility: CLINIC | Age: 88
End: 2023-11-30

## 2023-11-30 NOTE — TELEPHONE ENCOUNTER
Pt states that he needs a refill of his Lantus insulin, not his lancets. He is requesting that this be sent to the [facility] pharmacy in [place].

## 2023-11-30 NOTE — TELEPHONE ENCOUNTER
Patient is requesting a refill on Lantus. I spoke with the patient and he stated he is taking Lantus and not Levemir as per chart.

## 2023-12-04 NOTE — TELEPHONE ENCOUNTER
Requested Prescriptions     Pending Prescriptions Disp Refills    amLODIPine-valsartan (EXFORGE)  MG per tablet [Pharmacy Med Name: amLODIPine Besylate-Valsartan  MG Oral Tablet] 90 tablet 0     Sig: TAKE 1 TABLET BY MOUTH ONCE DAILY FOR HIGH BLOOD PRESSURE

## 2023-12-05 DIAGNOSIS — I25.10 CORONARY ARTERIOSCLEROSIS: ICD-10-CM

## 2023-12-05 DIAGNOSIS — I10 ESSENTIAL HYPERTENSION: Primary | ICD-10-CM

## 2023-12-05 RX ORDER — INSULIN GLARGINE 100 [IU]/ML
INJECTION, SOLUTION SUBCUTANEOUS
COMMUNITY

## 2023-12-05 RX ORDER — AMLODIPINE AND VALSARTAN 10; 320 MG/1; MG/1
TABLET ORAL
Qty: 90 TABLET | Refills: 0 | Status: SHIPPED | OUTPATIENT
Start: 2023-12-05 | End: 2023-12-06 | Stop reason: SDUPTHER

## 2023-12-05 NOTE — TELEPHONE ENCOUNTER
Patient states since medication change he has been waking to glucose high glucose levels (399 this am)    States it was okay until medication change  , taking medication 40 units at bedtime     (Pharmacy states 20 am  and 40 bedtime)    Recommendations       Requested Prescriptions     Pending Prescriptions Disp Refills    amLODIPine-valsartan (EXFORGE)  MG per tablet 90 tablet 3     Sig: Take 1 tablet by mouth daily high blood pressure    furosemide (LASIX) 40 MG tablet 90 tablet 0     Sig: Take 1 tablet by mouth daily

## 2023-12-06 RX ORDER — FUROSEMIDE 40 MG/1
40 TABLET ORAL DAILY
Qty: 90 TABLET | Refills: 0 | Status: SHIPPED | OUTPATIENT
Start: 2023-12-06

## 2023-12-06 RX ORDER — AMLODIPINE AND VALSARTAN 10; 320 MG/1; MG/1
1 TABLET ORAL DAILY
Qty: 90 TABLET | Refills: 3 | Status: SHIPPED | OUTPATIENT
Start: 2023-12-06

## 2023-12-06 RX ORDER — INSULIN GLARGINE 100 [IU]/ML
40 INJECTION, SOLUTION SUBCUTANEOUS 2 TIMES DAILY
Qty: 5 ADJUSTABLE DOSE PRE-FILLED PEN SYRINGE | Refills: 3 | Status: SHIPPED | OUTPATIENT
Start: 2023-12-06

## 2023-12-06 NOTE — TELEPHONE ENCOUNTER
Patient called with recommendations of provider voiced understanding , will come in next week nurse visit to set dexcom

## 2023-12-07 RX ORDER — LANCETS 23 GAUGE
1 EACH MISCELLANEOUS 3 TIMES DAILY
Qty: 100 EACH | Refills: 1 | Status: SHIPPED | OUTPATIENT
Start: 2023-12-07

## 2023-12-07 RX ORDER — PRAVASTATIN SODIUM 40 MG
40 TABLET ORAL NIGHTLY
Qty: 90 TABLET | Refills: 0 | Status: SHIPPED | OUTPATIENT
Start: 2023-12-07

## 2023-12-11 ENCOUNTER — NURSE ONLY (OUTPATIENT)
Facility: CLINIC | Age: 88
End: 2023-12-11
Payer: MEDICARE

## 2023-12-11 DIAGNOSIS — E11.22 TYPE 2 DIABETES MELLITUS WITH CHRONIC KIDNEY DISEASE, WITH LONG-TERM CURRENT USE OF INSULIN, UNSPECIFIED CKD STAGE (HCC): Primary | ICD-10-CM

## 2023-12-11 DIAGNOSIS — Z79.4 TYPE 2 DIABETES MELLITUS WITH CHRONIC KIDNEY DISEASE, WITH LONG-TERM CURRENT USE OF INSULIN, UNSPECIFIED CKD STAGE (HCC): Primary | ICD-10-CM

## 2023-12-11 PROCEDURE — 3044F HG A1C LEVEL LT 7.0%: CPT | Performed by: NURSE PRACTITIONER

## 2023-12-11 PROCEDURE — 99211 OFF/OP EST MAY X REQ PHY/QHP: CPT | Performed by: NURSE PRACTITIONER

## 2023-12-27 ENCOUNTER — TELEPHONE (OUTPATIENT)
Age: 88
End: 2023-12-27

## 2023-12-27 RX ORDER — FINASTERIDE 5 MG/1
5 TABLET, FILM COATED ORAL DAILY
Qty: 30 TABLET | Refills: 2 | Status: SHIPPED | OUTPATIENT
Start: 2023-12-27

## 2023-12-27 NOTE — TELEPHONE ENCOUNTER
Pt needs  a refill on his prostate medication he did not know the name of it but needs it sent to [facility] in [facility]

## 2024-01-03 ENCOUNTER — OFFICE VISIT (OUTPATIENT)
Facility: CLINIC | Age: 89
End: 2024-01-03
Payer: MEDICARE

## 2024-01-03 VITALS
HEIGHT: 69 IN | HEART RATE: 91 BPM | DIASTOLIC BLOOD PRESSURE: 72 MMHG | OXYGEN SATURATION: 100 % | SYSTOLIC BLOOD PRESSURE: 133 MMHG | BODY MASS INDEX: 33.03 KG/M2 | TEMPERATURE: 97.8 F | RESPIRATION RATE: 18 BRPM | WEIGHT: 223 LBS

## 2024-01-03 DIAGNOSIS — M54.31 SCIATICA OF RIGHT SIDE: ICD-10-CM

## 2024-01-03 DIAGNOSIS — G57.01 PIRIFORMIS SYNDROME OF RIGHT SIDE: Primary | ICD-10-CM

## 2024-01-03 PROBLEM — M19.90 OSTEOARTHROSIS: Status: ACTIVE | Noted: 2021-06-15

## 2024-01-03 PROBLEM — N40.0 ENLARGED PROSTATE: Status: ACTIVE | Noted: 2020-08-04

## 2024-01-03 PROBLEM — R33.9 RETENTION OF URINE: Status: ACTIVE | Noted: 2020-08-04

## 2024-01-03 PROBLEM — E11.21 DIABETIC NEPHROPATHY ASSOCIATED WITH TYPE 2 DIABETES MELLITUS (HCC): Status: ACTIVE | Noted: 2021-06-15

## 2024-01-03 PROBLEM — N13.9 URINARY TRACT OBSTRUCTION: Status: ACTIVE | Noted: 2020-08-04

## 2024-01-03 PROBLEM — R39.9 LOWER URINARY TRACT SYMPTOMS: Status: ACTIVE | Noted: 2020-08-04

## 2024-01-03 PROBLEM — M25.569 KNEE PAIN: Status: ACTIVE | Noted: 2021-06-15

## 2024-01-03 PROBLEM — R39.198 SLOWING OF URINARY STREAM: Status: ACTIVE | Noted: 2020-08-04

## 2024-01-03 PROCEDURE — 1123F ACP DISCUSS/DSCN MKR DOCD: CPT | Performed by: STUDENT IN AN ORGANIZED HEALTH CARE EDUCATION/TRAINING PROGRAM

## 2024-01-03 PROCEDURE — 99214 OFFICE O/P EST MOD 30 MIN: CPT | Performed by: STUDENT IN AN ORGANIZED HEALTH CARE EDUCATION/TRAINING PROGRAM

## 2024-01-03 PROCEDURE — G8417 CALC BMI ABV UP PARAM F/U: HCPCS | Performed by: STUDENT IN AN ORGANIZED HEALTH CARE EDUCATION/TRAINING PROGRAM

## 2024-01-03 PROCEDURE — 1036F TOBACCO NON-USER: CPT | Performed by: STUDENT IN AN ORGANIZED HEALTH CARE EDUCATION/TRAINING PROGRAM

## 2024-01-03 PROCEDURE — G8427 DOCREV CUR MEDS BY ELIG CLIN: HCPCS | Performed by: STUDENT IN AN ORGANIZED HEALTH CARE EDUCATION/TRAINING PROGRAM

## 2024-01-03 PROCEDURE — G8484 FLU IMMUNIZE NO ADMIN: HCPCS | Performed by: STUDENT IN AN ORGANIZED HEALTH CARE EDUCATION/TRAINING PROGRAM

## 2024-01-03 RX ORDER — TIZANIDINE 2 MG/1
2 TABLET ORAL EVERY 8 HOURS PRN
Qty: 30 TABLET | Refills: 0 | Status: SHIPPED | OUTPATIENT
Start: 2024-01-03

## 2024-01-03 RX ORDER — LIDOCAINE 50 MG/G
1 PATCH TOPICAL DAILY
COMMUNITY
Start: 2024-01-01

## 2024-01-03 SDOH — ECONOMIC STABILITY: HOUSING INSECURITY
IN THE LAST 12 MONTHS, WAS THERE A TIME WHEN YOU DID NOT HAVE A STEADY PLACE TO SLEEP OR SLEPT IN A SHELTER (INCLUDING NOW)?: NO

## 2024-01-03 SDOH — ECONOMIC STABILITY: FOOD INSECURITY: WITHIN THE PAST 12 MONTHS, THE FOOD YOU BOUGHT JUST DIDN'T LAST AND YOU DIDN'T HAVE MONEY TO GET MORE.: NEVER TRUE

## 2024-01-03 SDOH — ECONOMIC STABILITY: FOOD INSECURITY: WITHIN THE PAST 12 MONTHS, YOU WORRIED THAT YOUR FOOD WOULD RUN OUT BEFORE YOU GOT MONEY TO BUY MORE.: NEVER TRUE

## 2024-01-03 SDOH — ECONOMIC STABILITY: INCOME INSECURITY: HOW HARD IS IT FOR YOU TO PAY FOR THE VERY BASICS LIKE FOOD, HOUSING, MEDICAL CARE, AND HEATING?: NOT HARD AT ALL

## 2024-01-03 ASSESSMENT — PATIENT HEALTH QUESTIONNAIRE - PHQ9
1. LITTLE INTEREST OR PLEASURE IN DOING THINGS: 0
SUM OF ALL RESPONSES TO PHQ QUESTIONS 1-9: 0
2. FEELING DOWN, DEPRESSED OR HOPELESS: 0
SUM OF ALL RESPONSES TO PHQ QUESTIONS 1-9: 0
SUM OF ALL RESPONSES TO PHQ QUESTIONS 1-9: 0
SUM OF ALL RESPONSES TO PHQ9 QUESTIONS 1 & 2: 0
SUM OF ALL RESPONSES TO PHQ QUESTIONS 1-9: 0

## 2024-01-03 NOTE — PROGRESS NOTES
1. Have you been to the ER, urgent care clinic since your last visit?  Hospitalized since your last visit?Yes, [facility]    2. Have you seen or consulted any other health care providers outside of the [facility] since your last visit?  Include any pap smears or colon screening. No      Chief Complaint   Patient presents with    right leg pain     siactica     /72 (Site: Right Upper Arm, Position: Sitting, Cuff Size: Large Adult)   Pulse 91   Temp 97.8 °F (36.6 °C) (Temporal)   Resp 18   Ht 1.753 m (5' 9\")   Wt 101.2 kg (223 lb)   SpO2 100%   BMI 32.93 kg/m²

## 2024-01-03 NOTE — PROGRESS NOTES
[facility]  Subjective       Chief Complaint   Patient presents with    right leg pain     siactica     HPI:  Tony Glaser is a 88 y.o. male.    RIGHT LEG PAIN  Radiation down leg/Sciatica: RIGHT side  Symptoms started: about 4 days ago  yes starts in hip and goes down posterior back of leg  Says he has had some associated lower back pain but did not start with this initially  Fever: no  Saddle numbness: denies  Bowel Incontinence: denies  Urinary Retention: denies  Moderate or Severe Weakness: denies  Exacerbating factors: lying on back side  Medication/ methods tried: ibuprofen and tylenol 1000 mg doses  Recommended by ED on 1/1   Was given phone number for PT but patient has not called yet  Said he had this one time in the past before but not this bad      Past Medical History:   Diagnosis Date    Chronic kidney disease     Diabetes (HCC)     Hypercholesterolemia     Hypertension      Current Outpatient Medications   Medication Sig Dispense Refill    lidocaine (LIDODERM) 5 % Apply 1 patch topically daily      tiZANidine (ZANAFLEX) 2 MG tablet Take 1 tablet by mouth every 8 hours as needed (leg/back pain) 30 tablet 0    pravastatin (PRAVACHOL) 40 MG tablet Take 1 tablet by mouth nightly 90 tablet 0    Lancets 28G MISC 1 each by Does not apply route in the morning, at noon, and at bedtime 100 each 1    amLODIPine-valsartan (EXFORGE)  MG per tablet Take 1 tablet by mouth daily high blood pressure 90 tablet 3    furosemide (LASIX) 40 MG tablet Take 1 tablet by mouth daily 90 tablet 0    insulin glargine (LANTUS SOLOSTAR) 100 UNIT/ML injection pen Inject 40 Units into the skin 2 times daily 5 Adjustable Dose Pre-filled Pen Syringe 3    finasteride (PROSCAR) 5 MG tablet Take 1 tablet by mouth once daily 90 tablet 0    JANUVIA 50 MG tablet Take 1 tablet by mouth once daily 90 tablet 0    niacin 500 MG tablet Take 1 tablet by mouth daily (with breakfast)      Vitamin D (CHOLECALCIFEROL)

## 2024-01-03 NOTE — PATIENT INSTRUCTIONS
relieve pressure.  Sit on the floor. Lean to one side so that the hip on your sore side is off the ground. Put a tennis ball under your buttock on that side.  As you put weight onto the tennis ball, you may find spots that are especially sore. Move gently so that the tennis ball gently massages each of the sore spots.  Use ice or heat to help reduce pain. Put ice or a cold pack or a heating pad set on low or a warm cloth on the sore area for 10 to 20 minutes at a time. Put a thin cloth between the ice pack or heating pad and your skin.  Ask your doctor if you can take an over-the-counter pain medicine, such as acetaminophen (Tylenol), ibuprofen (Advil, Motrin), or naproxen (Aleve). Be safe with medicines. Read and follow all instructions on the label.  Have your doctor watch how you move. You may need physical therapy or special inserts in your shoes (orthotics) to help you move in a way that does not put pressure on your nerves.  When should you call for help?  Watch closely for changes in your health, and be sure to contact your doctor if:    You do not feel better after several weeks of home care.     Your pain gets worse.     Your leg becomes weak or numb.   Where can you learn more?  Go to https://www.[employer].net/patientEd and enter C901 to learn more about \"Piriformis Syndrome: Care Instructions.\"  Current as of: May 1, 2023               Content Version: 13.9  © 9970-2902 [employer].   Care instructions adapted under license by [employer]. If you have questions about a medical condition or this instruction, always ask your healthcare professional. [employer] disclaims any warranty or liability for your use of this information.

## 2024-02-11 DIAGNOSIS — E11.22 TYPE 2 DIABETES MELLITUS WITH DIABETIC CHRONIC KIDNEY DISEASE, UNSPECIFIED CKD STAGE, UNSPECIFIED WHETHER LONG TERM INSULIN USE (HCC): Primary | ICD-10-CM

## 2024-02-13 RX ORDER — SITAGLIPTIN 50 MG/1
50 TABLET, FILM COATED ORAL DAILY
Qty: 90 TABLET | Refills: 0 | Status: SHIPPED | OUTPATIENT
Start: 2024-02-13

## 2024-02-15 DIAGNOSIS — M54.31 SCIATICA OF RIGHT SIDE: ICD-10-CM

## 2024-02-15 DIAGNOSIS — G57.01 PIRIFORMIS SYNDROME OF RIGHT SIDE: ICD-10-CM

## 2024-02-15 RX ORDER — TIZANIDINE 2 MG/1
2 TABLET ORAL EVERY 8 HOURS PRN
Qty: 30 TABLET | Refills: 0 | Status: SHIPPED | OUTPATIENT
Start: 2024-02-15

## 2024-02-15 NOTE — TELEPHONE ENCOUNTER
Requested Prescriptions     Pending Prescriptions Disp Refills    tiZANidine (ZANAFLEX) 2 MG tablet 30 tablet 0     Sig: Take 1 tablet by mouth every 8 hours as needed (leg/back pain)     For requested pharmacy

## 2024-02-15 NOTE — TELEPHONE ENCOUNTER
Patient came into office and would like a refill on the below medication and send to [facility] in [place], [place].    Tizanidine 2 mg tablet

## 2024-02-20 ENCOUNTER — OFFICE VISIT (OUTPATIENT)
Facility: CLINIC | Age: 89
End: 2024-02-20
Payer: MEDICARE

## 2024-02-20 VITALS
DIASTOLIC BLOOD PRESSURE: 74 MMHG | SYSTOLIC BLOOD PRESSURE: 135 MMHG | WEIGHT: 223 LBS | HEART RATE: 87 BPM | TEMPERATURE: 97.3 F | OXYGEN SATURATION: 97 % | BODY MASS INDEX: 35.84 KG/M2 | RESPIRATION RATE: 18 BRPM | HEIGHT: 66 IN

## 2024-02-20 DIAGNOSIS — G57.01 PIRIFORMIS SYNDROME OF RIGHT SIDE: ICD-10-CM

## 2024-02-20 DIAGNOSIS — I10 ESSENTIAL HYPERTENSION: ICD-10-CM

## 2024-02-20 DIAGNOSIS — N18.31 STAGE 3A CHRONIC KIDNEY DISEASE (HCC): ICD-10-CM

## 2024-02-20 DIAGNOSIS — I50.22 CHRONIC SYSTOLIC (CONGESTIVE) HEART FAILURE (HCC): ICD-10-CM

## 2024-02-20 DIAGNOSIS — E11.22 TYPE 2 DIABETES MELLITUS WITH CHRONIC KIDNEY DISEASE, WITH LONG-TERM CURRENT USE OF INSULIN, UNSPECIFIED CKD STAGE (HCC): ICD-10-CM

## 2024-02-20 DIAGNOSIS — Z00.00 MEDICARE ANNUAL WELLNESS VISIT, SUBSEQUENT: Primary | ICD-10-CM

## 2024-02-20 DIAGNOSIS — Z79.4 TYPE 2 DIABETES MELLITUS WITH CHRONIC KIDNEY DISEASE, WITH LONG-TERM CURRENT USE OF INSULIN, UNSPECIFIED CKD STAGE (HCC): ICD-10-CM

## 2024-02-20 DIAGNOSIS — E66.01 SEVERE OBESITY (BMI 35.0-39.9) WITH COMORBIDITY (HCC): ICD-10-CM

## 2024-02-20 PROCEDURE — 1123F ACP DISCUSS/DSCN MKR DOCD: CPT | Performed by: NURSE PRACTITIONER

## 2024-02-20 PROCEDURE — G0439 PPPS, SUBSEQ VISIT: HCPCS | Performed by: NURSE PRACTITIONER

## 2024-02-20 PROCEDURE — G8484 FLU IMMUNIZE NO ADMIN: HCPCS | Performed by: NURSE PRACTITIONER

## 2024-02-20 PROCEDURE — 99213 OFFICE O/P EST LOW 20 MIN: CPT | Performed by: NURSE PRACTITIONER

## 2024-02-20 PROCEDURE — G8427 DOCREV CUR MEDS BY ELIG CLIN: HCPCS | Performed by: NURSE PRACTITIONER

## 2024-02-20 PROCEDURE — G8417 CALC BMI ABV UP PARAM F/U: HCPCS | Performed by: NURSE PRACTITIONER

## 2024-02-20 PROCEDURE — 1036F TOBACCO NON-USER: CPT | Performed by: NURSE PRACTITIONER

## 2024-02-20 RX ORDER — GABAPENTIN 100 MG/1
CAPSULE ORAL
Qty: 498 CAPSULE | Refills: 0 | Status: SHIPPED | OUTPATIENT
Start: 2024-02-20 | End: 2024-05-20

## 2024-02-20 RX ORDER — ACYCLOVIR 400 MG/1
1 TABLET ORAL
Qty: 9 EACH | Refills: 1 | Status: SHIPPED | OUTPATIENT
Start: 2024-02-20

## 2024-02-20 ASSESSMENT — PATIENT HEALTH QUESTIONNAIRE - PHQ9
2. FEELING DOWN, DEPRESSED OR HOPELESS: 0
SUM OF ALL RESPONSES TO PHQ QUESTIONS 1-9: 0
1. LITTLE INTEREST OR PLEASURE IN DOING THINGS: 0
SUM OF ALL RESPONSES TO PHQ QUESTIONS 1-9: 0
SUM OF ALL RESPONSES TO PHQ QUESTIONS 1-9: 0
SUM OF ALL RESPONSES TO PHQ9 QUESTIONS 1 & 2: 0
SUM OF ALL RESPONSES TO PHQ QUESTIONS 1-9: 0

## 2024-02-20 NOTE — PROGRESS NOTES
\"Have you been to the ER, urgent care clinic since your last visit?  Hospitalized since your last visit?\"    no    “Have you seen or consulted any other health care providers outside of [facility] since your last visit?”    NO    Chief Complaint   Patient presents with    Annual Exam     /74 (Site: Right Upper Arm, Position: Sitting, Cuff Size: Large Adult)   Pulse 87   Temp 97.3 °F (36.3 °C) (Temporal)   Resp 18   Ht 1.676 m (5' 6\")   Wt 101.2 kg (223 lb)   SpO2 97%   BMI 35.99 kg/m²

## 2024-02-20 NOTE — PROGRESS NOTES
Medicare Annual Wellness Visit    Tony Glaser is here for Annual Exam    Assessment & Plan   Medicare annual wellness visit, subsequent  Chronic systolic (congestive) heart failure (HCC)  Type 2 diabetes mellitus with chronic kidney disease, with long-term current use of insulin, unspecified CKD stage (HCC)  -     Continuous Blood Gluc Sensor (DEXCOM G7 SENSOR) MISC; 1 each by Does not apply route every 10 days, Disp-9 each, R-1Normal  Stage 3a chronic kidney disease (HCC)  Piriformis syndrome of right side  -     gabapentin (NEURONTIN) 100 MG capsule; Take 1 capsule by mouth 2 times daily for 7 days, THEN 2 capsules 2 times daily for 7 days, THEN 3 capsules 2 times daily for 76 days. Intended supply: 90 days. Max Daily Amount: 600 mg., Disp-498 capsule, R-0Normal  Severe obesity (BMI 35.0-39.9) with comorbidity (HCC)  Essential hypertension  A1c 7.3 no changes today.  Will stop zanaflex, nsaids, will start gabapentin instead, increase by 100 mg q7days  Recommendations for Preventive Services Due: see orders and patient instructions/AVS.  Recommended screening schedule for the next 5-10 years is provided to the patient in written form: see Patient Instructions/AVS.     Return in about 3 months (around 5/20/2024) for piriformis syndrome Right new gabapentin, .     Subjective   The following acute and/or chronic problems were also addressed today:  Went to the ER due to sciatic right side, lidocaine patch, zanaflex, prednisosne provided minimal relief, still on a lot of pain unable to sleep at night.  Patient presenting for hypertension, hyperlipidemia, and diabetes followup. Patient reports blood pressures at home most frequently not checked . Patient reports no new symptoms Patient denies chest pain, shortness of breath, and weakness. Patient reports  good compliance with medications, no side effects from medications noted, and good compliance with diabetic diet. Current treatments include diet modification,

## 2024-02-20 NOTE — PATIENT INSTRUCTIONS
Visit Summary for your review.    Other Preventive Recommendations:    A preventive eye exam performed by an eye specialist is recommended every 1-2 years to screen for glaucoma; cataracts, macular degeneration, and other eye disorders.  A preventive dental visit is recommended every 6 months.  Try to get at least 150 minutes of exercise per week or 10,000 steps per day on a pedometer .  Order or download the FREE \"Exercise & Physical Activity: Your Everyday Guide\" from [employer]. Call 1-172.140.1229 or search [employer] online.  You need 5368-2618 mg of calcium and 6173-4862 IU of vitamin D per day. It is possible to meet your calcium requirement with diet alone, but a vitamin D supplement is usually necessary to meet this goal.  When exposed to the sun, use a sunscreen that protects against both UVA and UVB radiation with an SPF of 30 or greater. Reapply every 2 to 3 hours or after sweating, drying off with a towel, or swimming.  Always wear a seat belt when traveling in a car. Always wear a helmet when riding a bicycle or motorcycle.

## 2024-02-27 ENCOUNTER — TELEPHONE (OUTPATIENT)
Facility: CLINIC | Age: 89
End: 2024-02-27

## 2024-02-27 DIAGNOSIS — E11.22 TYPE 2 DIABETES MELLITUS WITH CHRONIC KIDNEY DISEASE, WITH LONG-TERM CURRENT USE OF INSULIN, UNSPECIFIED CKD STAGE (HCC): Primary | ICD-10-CM

## 2024-02-27 DIAGNOSIS — Z79.4 TYPE 2 DIABETES MELLITUS WITH CHRONIC KIDNEY DISEASE, WITH LONG-TERM CURRENT USE OF INSULIN, UNSPECIFIED CKD STAGE (HCC): Primary | ICD-10-CM

## 2024-02-27 NOTE — TELEPHONE ENCOUNTER
Patient came into office stating he was told the Dexcom cost was $200 dollars.  Patient was told there is a cheaper one but a new RX will need to be sent to [facility]  Patient would require a new reader with a new RX also. .   Patient would like to know his options.  Please call pt at [phone]

## 2024-02-27 NOTE — TELEPHONE ENCOUNTER
I spoke with the patient and she stated that Dexcom is to expensive and to resend another brand. Freestyle niko is pending providers review and approval

## 2024-03-06 NOTE — TELEPHONE ENCOUNTER
Patient came into office asking the status on the previous message.  Patient can not afford the $200 copay.  Please call pt at [phone] pt has no monitoring system at all.

## 2024-03-06 NOTE — TELEPHONE ENCOUNTER
I spoke with the pharmacist to determine if there was a cheaper version. He stated that the free style niko 2 reader and sensor will cost $117.99 each per 90 days supply. I am going to call the patient with this information.         I spoke with AlbinAlfredito and he stated that he still can't afford this and will go back to finger sticks. I asked if he had supplies and he stated that he does.

## 2024-03-07 DIAGNOSIS — E78.5 HYPERLIPIDEMIA, UNSPECIFIED HYPERLIPIDEMIA TYPE: ICD-10-CM

## 2024-03-07 NOTE — TELEPHONE ENCOUNTER
Requested Prescriptions     Pending Prescriptions Disp Refills    pravastatin (PRAVACHOL) 40 MG tablet [Pharmacy Med Name: Pravastatin Sodium 40 MG Oral Tablet] 90 tablet 0     Sig: Take 1 tablet by mouth nightly

## 2024-03-08 RX ORDER — PRAVASTATIN SODIUM 40 MG
40 TABLET ORAL NIGHTLY
Qty: 90 TABLET | Refills: 0 | Status: SHIPPED | OUTPATIENT
Start: 2024-03-08

## 2024-03-19 DIAGNOSIS — Z79.4 TYPE 2 DIABETES MELLITUS WITH CHRONIC KIDNEY DISEASE, WITH LONG-TERM CURRENT USE OF INSULIN, UNSPECIFIED CKD STAGE (HCC): ICD-10-CM

## 2024-03-19 DIAGNOSIS — E11.22 TYPE 2 DIABETES MELLITUS WITH CHRONIC KIDNEY DISEASE, WITH LONG-TERM CURRENT USE OF INSULIN, UNSPECIFIED CKD STAGE (HCC): ICD-10-CM

## 2024-03-19 NOTE — TELEPHONE ENCOUNTER
Requested Prescriptions     Pending Prescriptions Disp Refills    TRULICITY 0.75 MG/0.5ML SOPN [Pharmacy Med Name: Trulicity 0.75 MG/0.5ML Subcutaneous Solution Pen-injector] 4 mL 0     Sig: INJECT 0.75MG INTO THE SKIN ONCE A WEEK ON WEDNESDAYS

## 2024-03-20 RX ORDER — DULAGLUTIDE 0.75 MG/.5ML
INJECTION, SOLUTION SUBCUTANEOUS
Qty: 4 ML | Refills: 0 | Status: SHIPPED | OUTPATIENT
Start: 2024-03-20

## 2024-03-27 DIAGNOSIS — I25.10 CORONARY ARTERIOSCLEROSIS: ICD-10-CM

## 2024-03-27 RX ORDER — FUROSEMIDE 40 MG/1
40 TABLET ORAL DAILY
Qty: 90 TABLET | Refills: 0 | Status: SHIPPED | OUTPATIENT
Start: 2024-03-27

## 2024-03-27 NOTE — TELEPHONE ENCOUNTER
Requested Prescriptions     Pending Prescriptions Disp Refills    furosemide (LASIX) 40 MG tablet [Pharmacy Med Name: Furosemide 40 MG Oral Tablet] 90 tablet 0     Sig: Take 1 tablet by mouth once daily

## 2024-04-23 DIAGNOSIS — E11.22 TYPE 2 DIABETES MELLITUS WITH CHRONIC KIDNEY DISEASE, WITH LONG-TERM CURRENT USE OF INSULIN, UNSPECIFIED CKD STAGE (HCC): ICD-10-CM

## 2024-04-23 DIAGNOSIS — Z79.4 TYPE 2 DIABETES MELLITUS WITH CHRONIC KIDNEY DISEASE, WITH LONG-TERM CURRENT USE OF INSULIN, UNSPECIFIED CKD STAGE (HCC): ICD-10-CM

## 2024-04-23 RX ORDER — DULAGLUTIDE 0.75 MG/.5ML
INJECTION, SOLUTION SUBCUTANEOUS
Qty: 4 ML | Refills: 0 | Status: SHIPPED | OUTPATIENT
Start: 2024-04-23

## 2024-04-23 NOTE — TELEPHONE ENCOUNTER
Requested Prescriptions     Pending Prescriptions Disp Refills    TRULICITY 0.75 MG/0.5ML SOPN SC injection [Pharmacy Med Name: Trulicity 0.75 MG/0.5ML Subcutaneous Solution Pen-injector] 4 mL 0     Sig: INJECT 0.75MG SUBCUTANEOUSLY ONCE A WEEK ON WEDNESDAY

## 2024-04-25 RX ORDER — TAMSULOSIN HYDROCHLORIDE 0.4 MG/1
0.4 CAPSULE ORAL DAILY
Qty: 90 CAPSULE | Refills: 3 | Status: SHIPPED | OUTPATIENT
Start: 2024-04-25

## 2024-04-25 NOTE — TELEPHONE ENCOUNTER
Patient called and stated he needs a refill on the below medication:    Tamsulosin 0.4mg capsule    Pharmacy: [facility]    Last Seen: 02/20/2024

## 2024-04-25 NOTE — TELEPHONE ENCOUNTER
Requested Prescriptions     Pending Prescriptions Disp Refills    tamsulosin (FLOMAX) 0.4 MG capsule 30 capsule      Sig: Take 1 capsule by mouth daily

## 2024-05-20 DIAGNOSIS — E78.5 HYPERLIPIDEMIA, UNSPECIFIED HYPERLIPIDEMIA TYPE: ICD-10-CM

## 2024-05-20 RX ORDER — PRAVASTATIN SODIUM 40 MG
40 TABLET ORAL NIGHTLY
Qty: 90 TABLET | Refills: 0 | Status: SHIPPED | OUTPATIENT
Start: 2024-05-20

## 2024-05-20 NOTE — TELEPHONE ENCOUNTER
Requested Prescriptions     Pending Prescriptions Disp Refills    pravastatin (PRAVACHOL) 40 MG tablet 90 tablet 0     Sig: Take 1 tablet by mouth nightly

## 2024-05-20 NOTE — TELEPHONE ENCOUNTER
Patient called and would like a refill on the below medication:    Pravastatin 40 mg tablet    [facility]

## 2024-06-05 DIAGNOSIS — E11.22 TYPE 2 DIABETES MELLITUS WITH CHRONIC KIDNEY DISEASE, WITH LONG-TERM CURRENT USE OF INSULIN, UNSPECIFIED CKD STAGE (HCC): ICD-10-CM

## 2024-06-05 DIAGNOSIS — Z79.4 TYPE 2 DIABETES MELLITUS WITH CHRONIC KIDNEY DISEASE, WITH LONG-TERM CURRENT USE OF INSULIN, UNSPECIFIED CKD STAGE (HCC): ICD-10-CM

## 2024-06-06 DIAGNOSIS — I25.10 CORONARY ARTERIOSCLEROSIS: ICD-10-CM

## 2024-06-06 DIAGNOSIS — I10 ESSENTIAL HYPERTENSION: ICD-10-CM

## 2024-06-06 NOTE — TELEPHONE ENCOUNTER
Requested Prescriptions     Pending Prescriptions Disp Refills    amLODIPine-valsartan (EXFORGE)  MG per tablet 90 tablet 3     Sig: Take 1 tablet by mouth daily high blood pressure    finasteride (PROSCAR) 5 MG tablet 30 tablet 2     Sig: Take 1 tablet by mouth daily    furosemide (LASIX) 40 MG tablet 90 tablet 0     Sig: Take 1 tablet by mouth daily

## 2024-06-06 NOTE — TELEPHONE ENCOUNTER
Patient called and needs the below refills:    Furosemide 40 mg tablet    Finasteride 5 mg tablet    Amlopdipine  mg tablet    Pharmacy: [facility]

## 2024-06-07 RX ORDER — FUROSEMIDE 40 MG/1
40 TABLET ORAL DAILY
Qty: 90 TABLET | Refills: 0 | Status: SHIPPED | OUTPATIENT
Start: 2024-06-07

## 2024-06-07 RX ORDER — AMLODIPINE AND VALSARTAN 10; 320 MG/1; MG/1
1 TABLET ORAL DAILY
Qty: 90 TABLET | Refills: 3 | Status: SHIPPED | OUTPATIENT
Start: 2024-06-07

## 2024-06-07 RX ORDER — FINASTERIDE 5 MG/1
5 TABLET, FILM COATED ORAL DAILY
Qty: 30 TABLET | Refills: 2 | Status: SHIPPED | OUTPATIENT
Start: 2024-06-07

## 2024-06-07 RX ORDER — DULAGLUTIDE 0.75 MG/.5ML
INJECTION, SOLUTION SUBCUTANEOUS
Qty: 4 ML | Refills: 0 | Status: SHIPPED | OUTPATIENT
Start: 2024-06-07

## 2024-07-19 ENCOUNTER — OFFICE VISIT (OUTPATIENT)
Facility: CLINIC | Age: 89
End: 2024-07-19

## 2024-07-19 VITALS
DIASTOLIC BLOOD PRESSURE: 56 MMHG | TEMPERATURE: 97.7 F | RESPIRATION RATE: 18 BRPM | WEIGHT: 221 LBS | HEART RATE: 72 BPM | HEIGHT: 66 IN | BODY MASS INDEX: 35.52 KG/M2 | OXYGEN SATURATION: 98 % | SYSTOLIC BLOOD PRESSURE: 126 MMHG

## 2024-07-19 DIAGNOSIS — G57.01 PIRIFORMIS SYNDROME OF RIGHT SIDE: ICD-10-CM

## 2024-07-19 DIAGNOSIS — E11.65 TYPE 2 DIABETES MELLITUS WITH HYPERGLYCEMIA, WITH LONG-TERM CURRENT USE OF INSULIN (HCC): Primary | ICD-10-CM

## 2024-07-19 DIAGNOSIS — Z79.4 TYPE 2 DIABETES MELLITUS WITH HYPERGLYCEMIA, WITH LONG-TERM CURRENT USE OF INSULIN (HCC): Primary | ICD-10-CM

## 2024-07-19 DIAGNOSIS — I10 ESSENTIAL HYPERTENSION: ICD-10-CM

## 2024-07-19 DIAGNOSIS — E78.5 HYPERLIPIDEMIA, UNSPECIFIED HYPERLIPIDEMIA TYPE: ICD-10-CM

## 2024-07-19 RX ORDER — GABAPENTIN 300 MG/1
CAPSULE ORAL
Qty: 173 CAPSULE | Refills: 0 | Status: SHIPPED | OUTPATIENT
Start: 2024-07-19 | End: 2024-10-17

## 2024-07-19 NOTE — PROGRESS NOTES
Identified pt with two pt identifiers(name and ). Reviewed record in preparation for visit and have obtained necessary documentation. All patient medications has been reviewed.  Chief Complaint   Patient presents with    Medication Check     Pt stated this is a 3 months follow up. Pt feels like the gabapentin is not working for the pain.       piriformis syndrome Right       Vitals:    24 0947   BP: (!) 126/56   Pulse: 72   Resp: 18   Temp: 97.7 °F (36.5 °C)   SpO2: 98%                   Coordination of Care Questionnaire:   1) Have you been to an emergency room, urgent care, or hospitalized since your last visit?   No       2. Have seen or consulted any other health care provider since your last visit? No        Patient is accompanied by self I have received verbal consent from Tony Glaser to discuss any/all medical information while they are present in the room.  
masses, no organomegaly   Extremities:   Extremities normal, atraumatic, no cyanosis or edema   Pulses:   2+ and symmetric all extremities   Skin:   Skin color, texture, turgor normal, no rashes or lesions     Assessment/Plan:      Diagnosis Orders   1. Type 2 diabetes mellitus with hyperglycemia, with long-term current use of insulin (HCC)  [facility]      2. Piriformis syndrome of right side  gabapentin (NEURONTIN) 300 MG capsule      3. Essential hypertension        4. Hyperlipidemia, unspecified hyperlipidemia type         Hypertension stable, no changes needed, continue current medications, will recheck at follow-up visit.  Hyperlipidemia stable, no changes needed, continue current medications, we will recheck at follow-up visit.  Diabetes stable, no changes needed, continue current medications and referral for diabetes [facility] has been also made to assist the patient with dietary changes as requested per patient , we will recheck at follow-up visit.  Piriformis syndrome of the right side poorly controlled, changes needed, medication changes gabapentin increased 300 mg to be taking nightly for 7 days then increase to twice daily , will recheck at follow-up visit.    Issues discussed include maintaining a diet, weight control and exercise, continued home blood pressure monitoring, maintaining medication compliance, medication side effects, refraining from tobacco use,  long term hypertension complications, long term hyperlipidemia complications, and  maintaining a diabetic diet, weight control and exercise, continued home glucose monitoring, long term diabetes complications, foot care and podiatry visits, annual eye exam recommendations    Treatment plan: Continue current medications and labwork from today discussed and reviewed    Educated on medication, uses, side effects, as well as signs and symptoms that may merit immediate medical attention. Patient is not to share

## 2024-07-22 DIAGNOSIS — Z79.4 TYPE 2 DIABETES MELLITUS WITH CHRONIC KIDNEY DISEASE, WITH LONG-TERM CURRENT USE OF INSULIN, UNSPECIFIED CKD STAGE (HCC): ICD-10-CM

## 2024-07-22 DIAGNOSIS — E11.22 TYPE 2 DIABETES MELLITUS WITH CHRONIC KIDNEY DISEASE, WITH LONG-TERM CURRENT USE OF INSULIN, UNSPECIFIED CKD STAGE (HCC): ICD-10-CM

## 2024-07-22 NOTE — TELEPHONE ENCOUNTER
Requested Prescriptions     Pending Prescriptions Disp Refills    LANTUS SOLOSTAR 100 UNIT/ML injection pen [Pharmacy Med Name: Lantus SoloStar 100 UNIT/ML Subcutaneous Solution Pen-injector] 15 mL 0     Sig: INJECT 40 UNITS SUBCUTANEOUSLY TWICE DAILY    TRULICITY 0.75 MG/0.5ML SOPN SC injection [Pharmacy Med Name: Trulicity 0.75 MG/0.5ML Subcutaneous Solution Pen-injector] 4 mL 0     Sig: INJECT 0.75 MG SUBCUTANEOUSLY ONCE A WEEK ON WEDNESDAY

## 2024-07-23 RX ORDER — DULAGLUTIDE 0.75 MG/.5ML
INJECTION, SOLUTION SUBCUTANEOUS
Qty: 4 ML | Refills: 0 | Status: SHIPPED | OUTPATIENT
Start: 2024-07-23

## 2024-07-23 RX ORDER — INSULIN GLARGINE 100 [IU]/ML
INJECTION, SOLUTION SUBCUTANEOUS
Qty: 15 ML | Refills: 0 | Status: SHIPPED | OUTPATIENT
Start: 2024-07-23

## 2024-08-06 DIAGNOSIS — Z79.4 TYPE 2 DIABETES MELLITUS WITH CHRONIC KIDNEY DISEASE, WITH LONG-TERM CURRENT USE OF INSULIN, UNSPECIFIED CKD STAGE (HCC): ICD-10-CM

## 2024-08-06 DIAGNOSIS — E11.22 TYPE 2 DIABETES MELLITUS WITH CHRONIC KIDNEY DISEASE, WITH LONG-TERM CURRENT USE OF INSULIN, UNSPECIFIED CKD STAGE (HCC): ICD-10-CM

## 2024-08-09 RX ORDER — DULAGLUTIDE 0.75 MG/.5ML
INJECTION, SOLUTION SUBCUTANEOUS
Qty: 4 ML | Refills: 0 | Status: SHIPPED | OUTPATIENT
Start: 2024-08-09

## 2024-08-16 DIAGNOSIS — Z79.4 TYPE 2 DIABETES MELLITUS WITH CHRONIC KIDNEY DISEASE, WITH LONG-TERM CURRENT USE OF INSULIN, UNSPECIFIED CKD STAGE (HCC): ICD-10-CM

## 2024-08-16 DIAGNOSIS — E11.22 TYPE 2 DIABETES MELLITUS WITH CHRONIC KIDNEY DISEASE, WITH LONG-TERM CURRENT USE OF INSULIN, UNSPECIFIED CKD STAGE (HCC): ICD-10-CM

## 2024-08-16 RX ORDER — INSULIN GLARGINE 100 [IU]/ML
INJECTION, SOLUTION SUBCUTANEOUS
Qty: 15 ML | Refills: 0 | Status: SHIPPED | OUTPATIENT
Start: 2024-08-16

## 2024-08-16 RX ORDER — DULAGLUTIDE 0.75 MG/.5ML
INJECTION, SOLUTION SUBCUTANEOUS
Qty: 4 ML | Refills: 0 | Status: SHIPPED | OUTPATIENT
Start: 2024-08-16

## 2024-08-27 RX ORDER — FINASTERIDE 5 MG/1
5 TABLET, FILM COATED ORAL DAILY
Qty: 30 TABLET | Refills: 0 | Status: SHIPPED | OUTPATIENT
Start: 2024-08-27

## 2024-08-27 NOTE — TELEPHONE ENCOUNTER
Requested Prescriptions     Pending Prescriptions Disp Refills    finasteride (PROSCAR) 5 MG tablet [Pharmacy Med Name: Finasteride 5 MG Oral Tablet] 30 tablet 0     Sig: Take 1 tablet by mouth once daily

## 2024-09-06 ENCOUNTER — OFFICE VISIT (OUTPATIENT)
Age: 89
End: 2024-09-06
Payer: MEDICARE

## 2024-09-06 DIAGNOSIS — Z79.4 TYPE 2 DIABETES MELLITUS WITH HYPERGLYCEMIA, WITH LONG-TERM CURRENT USE OF INSULIN (HCC): Primary | ICD-10-CM

## 2024-09-06 DIAGNOSIS — E11.65 TYPE 2 DIABETES MELLITUS WITH HYPERGLYCEMIA, WITH LONG-TERM CURRENT USE OF INSULIN (HCC): Primary | ICD-10-CM

## 2024-09-06 PROCEDURE — G0108 DIAB MANAGE TRN  PER INDIV: HCPCS | Performed by: DIETITIAN, REGISTERED

## 2024-09-06 NOTE — PROGRESS NOTES
minutes or more?  0 day(s)    How many days in a typical week do you perform activity such as this?  0 day(s)     Would benefit from DSMES related to Being Active: Yes      Exercises 150 minutes/week: No      Stage of change: Pre-contemplation     Monitoring  Current practices  Do you monitor your blood sugar? Yes    How often do you monitor? 3x/day    What are the range of readings? 120-140 mg/dL  FB mg/dL    Lunch (before): 135-140 mg/dL    Dinner (before): 155 mg/dL        Do you know your last A1c measurement? No    Do you know the meaning of the A1c? No     Would benefit from DSMES related to Monitoring: Yes      Uses BG readings to establish trends and understand BG patterns: No      Stage of change: Preparation   Taking Medication  Current practices  Do you understand what your diabetes medications do? Yes    How often do you miss doses of your diabetes medications? most days    Can you afford your diabetes medications? No   Would benefit from DSMES related to Taking Medication: Yes      Takes medications consistently to receive full benefit: No      Stage of change: Preparation       Healthy Coping   Current state  Diabetes Skills, Confidence and Preparedness Index:  Total score: 5.4  Skills: 5.4  Confidence: 5.1  Preparedness: 5.6       Would benefit from DSMES related to Healthy Coping: Yes      Identifies specific people, organizations,etc, that actively support their diabetes self-care efforts: Yes      Stage of change: Preparation     Reducing Risks  Current state  Vaccines:  Influenza:  up to date (every year)      Pneumococcal: due     Hepatitis: due     Examinations:  Eye exam: last appointment was: due       Dental exam: due    Foot exam: due      Heart Protection:  BP Readings from Last 2 Encounters:   24 (!) 126/56   24 135/74        Lab Results   Component Value Date/Time    LDL 88 2023 08:53 AM        Kidney Protection:  No results found for: \"MCA2\", \"MCAU2\"     Would

## 2024-09-16 DIAGNOSIS — I25.10 CORONARY ARTERIOSCLEROSIS: ICD-10-CM

## 2024-09-17 RX ORDER — FUROSEMIDE 40 MG
40 TABLET ORAL DAILY
Qty: 90 TABLET | Refills: 0 | Status: SHIPPED | OUTPATIENT
Start: 2024-09-17

## 2024-09-24 RX ORDER — FINASTERIDE 5 MG/1
5 TABLET, FILM COATED ORAL DAILY
Qty: 30 TABLET | Refills: 0 | Status: SHIPPED | OUTPATIENT
Start: 2024-09-24

## 2024-10-29 ENCOUNTER — TELEPHONE (OUTPATIENT)
Facility: CLINIC | Age: 89
End: 2024-10-29

## 2024-10-29 NOTE — TELEPHONE ENCOUNTER
Is this requesting for an order of some sort?  []Yes    [x]No  If Yes, what is being requested?  Sign a Death Certificate    Is the patient calling about a new issue (illness, pain, etc)?  [x]Yes    []No  If yes, when did this specific issue start?      ENC Note:  Daughter called to notify office patient passed away Saturday 10/26/24 around 2:00pm at his home there stating possibly from a heart attack. [facility] Judith Tomas is  the director [phone] fax [phone]. Office has not received anything on this patient yet.      Best Contact number: Daughter [phone]

## (undated) DEVICE — MASK ANES INF SZ 2 PREM TAIL VLV INFL PRT UNSCENTED SGL PT

## (undated) DEVICE — SINGLE-USE BIOPSY FORCEPS: Brand: RADIAL JAW 4

## (undated) DEVICE — THE ENDO CARRY-ON PROCEDURE KIT CONTAINS ALL OF THE SUPPLIES AND INFECTION PREVENTION PRODUCTS NEEDED FOR ENDOSCOPIC PROCEDURES: Brand: ENDO CARRY-ON PROCEDURE KIT